# Patient Record
Sex: FEMALE | Race: BLACK OR AFRICAN AMERICAN | NOT HISPANIC OR LATINO | Employment: UNEMPLOYED
[De-identification: names, ages, dates, MRNs, and addresses within clinical notes are randomized per-mention and may not be internally consistent; named-entity substitution may affect disease eponyms.]

---

## 2017-06-17 ENCOUNTER — HEALTH MAINTENANCE LETTER (OUTPATIENT)
Age: 27
End: 2017-06-17

## 2017-10-15 ENCOUNTER — HOSPITAL ENCOUNTER (EMERGENCY)
Facility: CLINIC | Age: 27
Discharge: HOME | End: 2017-10-15
Attending: FAMILY MEDICINE | Admitting: FAMILY MEDICINE
Payer: COMMERCIAL

## 2017-10-15 ENCOUNTER — APPOINTMENT (OUTPATIENT)
Dept: ULTRASOUND IMAGING | Facility: CLINIC | Age: 27
End: 2017-10-15
Attending: FAMILY MEDICINE
Payer: COMMERCIAL

## 2017-10-15 VITALS
TEMPERATURE: 98.2 F | DIASTOLIC BLOOD PRESSURE: 60 MMHG | WEIGHT: 135.38 LBS | HEART RATE: 78 BPM | OXYGEN SATURATION: 100 % | BODY MASS INDEX: 22.53 KG/M2 | SYSTOLIC BLOOD PRESSURE: 97 MMHG | RESPIRATION RATE: 18 BRPM

## 2017-10-15 DIAGNOSIS — R51.9 HEADACHE IN PREGNANCY, ANTEPARTUM, SECOND TRIMESTER: ICD-10-CM

## 2017-10-15 DIAGNOSIS — N94.9 ROUND LIGAMENT PAIN: ICD-10-CM

## 2017-10-15 DIAGNOSIS — Z3A.18 18 WEEKS GESTATION OF PREGNANCY: ICD-10-CM

## 2017-10-15 DIAGNOSIS — O26.892 HEADACHE IN PREGNANCY, ANTEPARTUM, SECOND TRIMESTER: ICD-10-CM

## 2017-10-15 DIAGNOSIS — R10.2 ADNEXAL TENDERNESS, RIGHT: ICD-10-CM

## 2017-10-15 LAB
ALBUMIN UR-MCNC: 10 MG/DL
APPEARANCE UR: CLEAR
BACTERIA #/AREA URNS HPF: ABNORMAL /HPF
BILIRUB UR QL STRIP: NEGATIVE
COLOR UR AUTO: YELLOW
GLUCOSE UR STRIP-MCNC: NEGATIVE MG/DL
HCG UR QL: POSITIVE
HGB UR QL STRIP: NEGATIVE
INTERNAL QC OK POCT: YES
KETONES UR STRIP-MCNC: NEGATIVE MG/DL
LEUKOCYTE ESTERASE UR QL STRIP: NEGATIVE
MUCOUS THREADS #/AREA URNS LPF: PRESENT /LPF
NITRATE UR QL: NEGATIVE
PH UR STRIP: 7 PH (ref 5–7)
RBC #/AREA URNS AUTO: 1 /HPF (ref 0–2)
SOURCE: ABNORMAL
SP GR UR STRIP: 1.02 (ref 1–1.03)
SPECIMEN SOURCE: NORMAL
SQUAMOUS #/AREA URNS AUTO: 3 /HPF (ref 0–1)
UROBILINOGEN UR STRIP-MCNC: 4 MG/DL (ref 0–2)
WBC #/AREA URNS AUTO: <1 /HPF (ref 0–2)
WET PREP SPEC: NORMAL

## 2017-10-15 PROCEDURE — 81001 URINALYSIS AUTO W/SCOPE: CPT | Performed by: FAMILY MEDICINE

## 2017-10-15 PROCEDURE — 99284 EMERGENCY DEPT VISIT MOD MDM: CPT | Performed by: FAMILY MEDICINE

## 2017-10-15 PROCEDURE — 87491 CHLMYD TRACH DNA AMP PROBE: CPT | Performed by: FAMILY MEDICINE

## 2017-10-15 PROCEDURE — 76815 OB US LIMITED FETUS(S): CPT

## 2017-10-15 PROCEDURE — 87591 N.GONORRHOEAE DNA AMP PROB: CPT | Performed by: FAMILY MEDICINE

## 2017-10-15 PROCEDURE — 81025 URINE PREGNANCY TEST: CPT | Performed by: FAMILY MEDICINE

## 2017-10-15 PROCEDURE — 87210 SMEAR WET MOUNT SALINE/INK: CPT | Performed by: FAMILY MEDICINE

## 2017-10-15 PROCEDURE — 99283 EMERGENCY DEPT VISIT LOW MDM: CPT | Mod: Z6 | Performed by: FAMILY MEDICINE

## 2017-10-15 ASSESSMENT — ENCOUNTER SYMPTOMS
CONFUSION: 0
HEADACHES: 0
EYE REDNESS: 0
DIFFICULTY URINATING: 0
SHORTNESS OF BREATH: 0
ABDOMINAL PAIN: 1
FEVER: 0
COLOR CHANGE: 0
ARTHRALGIAS: 0
NECK STIFFNESS: 0

## 2017-10-15 NOTE — ED PROVIDER NOTES
History     Chief Complaint   Patient presents with     Vaginal Bleeding     Pt c/o vaginal spotting. Pt pregnant and c/o abd. cramping.      Vaginitis     Pt c/o vaginal infection.     HPI  Lavern Rodriguez is a 27 year old female with a history of asthma, alcohol abuse, and drug abuse who presents for evaluation of vaginal bleeding. Patient believes she is pregnant, but states she does not remember how long ago her last menstrual period was and has not yet had an ultrasound. Currently, she complains of mild lower abdominal pain as well as vaginal bleeding that has been ongoing for the past two days. She is .     I have reviewed the Medications, Allergies, Past Medical and Surgical History, and Social History in the [employer] system.  Past Medical History:   Diagnosis Date     Alcohol abuse      Asthma      Drug abuse        Past Surgical History:   Procedure Laterality Date     NO         Family History   Problem Relation Age of Onset     Asthma Brother      C.A.D. No family hx of      DIABETES No family hx of      Hypertension No family hx of      CEREBROVASCULAR DISEASE No family hx of      Breast Cancer No family hx of      Cancer - colorectal No family hx of      Prostate Cancer No family hx of        Social History   Substance Use Topics     Smoking status: Current Every Day Smoker     Packs/day: 0.50     Years: 4.00     Types: Cigarettes     Smokeless tobacco: Never Used     Alcohol use No      Comment: Currently in treatment       No current facility-administered medications for this encounter.      Current Outpatient Prescriptions   Medication     sertraline (ZOLOFT) 100 MG tablet     ibuprofen (ADVIL,MOTRIN) 800 MG tablet     ferrous sulfate 325 (65 FE) MG tablet     fexofenadine (ALLEGRA) 180 MG tablet     nicotine polacrilex (NICORETTE) 4 MG lozenge     loratadine (CLARITIN) 10 MG tablet     amitriptyline (ELAVIL) 25 MG tablet      No Known Allergies    Review of Systems   Constitutional:  Negative for fever.   HENT: Negative for congestion.    Eyes: Negative for redness.   Respiratory: Negative for shortness of breath.    Cardiovascular: Negative for chest pain.   Gastrointestinal: Positive for abdominal pain.   Genitourinary: Positive for vaginal bleeding. Negative for difficulty urinating.   Musculoskeletal: Negative for arthralgias and neck stiffness.   Skin: Negative for color change.   Neurological: Negative for headaches.   Psychiatric/Behavioral: Negative for confusion.   All other systems reviewed and are negative.      Physical Exam   BP: 102/58  Pulse: 78  Heart Rate: 80  Temp: 97.2  F (36.2  C)  Resp: 16  Weight: 61.4 kg (135 lb 6 oz)  SpO2: 100 %       Physical Exam    ED Course     ED Course     Procedures   4:08 PM  The patient was seen and examined by Dr. Handy in Room 5.          Critical Care time:  none             Labs Ordered and Resulted from Time of ED Arrival Up to the Time of Departure from the ED   HCG QUAL URINE POCT   WET PREP            Assessments & Plan (with Medical Decision Making)       I have reviewed the nursing notes.    I have reviewed the findings, diagnosis, plan and need for follow up with the patient.    Patient with 18 week pregnancy some abdominal discomfort thought to be possibly round ligament pain patient will follow up with her outpatient providers.        Final diagnoses:   18 weeks gestation of pregnancy   Round ligament pain   ILinda, am serving as a trained medical scribe to document services personally performed by Aleksander Handy MD, based on the provider's statements to me.   Aleksander MADISON MD, was physically present and have reviewed and verified the accuracy of this note documented by Linda Bain.      10/15/2017   [facility], EMERGENCY DEPARTMENT     Aleksander Handy MD  10/18/17 0046

## 2017-10-15 NOTE — DISCHARGE INSTRUCTIONS
Adapting to Pregnancy: Second Trimester  Keep up the healthy habits you started in your first trimester. You might be a little more tired than normal. So plan your day wisely. Look at the tips below and choose the ones that suit your lifestyle.    If you have any questions, check with your healthcare provider.   If you work  If you can, adjust your work with your employer to fit your needs. Try these tips:    If you stand for long periods, find ways to do some tasks while sitting. Also, try to stand with 1 foot resting on a low stool or ledge. Shift your weight from foot to foot often. Wear low-heeled shoes.    If you sit, keep your knees level with your hips. Rest your feet on a firm surface. Sit tall with support for your low back.    If you work long hours, ask about adjusting your schedule. Try taking shorter breaks more often.  When you travel  The second trimester may be the best time for any travel. Talk to your healthcare provider about any special plans you may need to make. Always:    Wear a seat belt. Fasten the lap part under your belly. Wear the shoulder part also.    Take breaks often during long trips by car or plane. Move around to stretch your legs.    Drink plenty of fluids on flights. The air in plane cabins is very dry.    Avoid hot climates or high altitudes if you are not used to them.    Avoid places where the food and water might make you sick.    Make sure you are up-to-date on all immunizations, including the flu vaccine. This is especially important when traveling overseas.  Taking time to relax  Find time to rest and relax at work or at home:    Take short time-outs daily. Do relaxation exercises.    Breathe deeply during stressful times.    Try not to take on too much. Plan tasks for times when you have the most energy.    Take naps when you can. Or just sit and relax.    After week 16, avoid lying on your back for more than a few minutes. Instead, lie on your side. Switch sides  often.  Continuing as lovers  Unless your healthcare provider tells you otherwise, there is no reason to stop having sex now. Blood supply increases to the pelvic area in the second trimester. Because of this, sex might be more enjoyable. Try different positions and see what s best. Also, talk to your partner about any changes in desire. Spotting may happen after sex. Be sure to let your healthcare provider know if there is heavy bleeding.  Keeping your environment safe  You can still clean house and use scented products. Just take some simple precautions:    Wear gloves when using cleaning fluids.    Open windows to let in fresh air. Use a fan if you paint.    Avoid secondhand smoke.    Don t breathe fumes from nail polish, hair spray, cleansers, or other chemicals.  Date Last Reviewed: 8/16/2015 2000-2017 [employer]. [address] [place] [address], [place] [address]. All rights reserved. This information is not intended as a substitute for professional medical care. Always follow your healthcare professional's instructions.

## 2017-10-15 NOTE — ED AVS SNAPSHOT
[facility], Emergency Department    [address]    [place] [address]    Phone:  [phone]    Fax:  [phone]                                       Lavern Rodriguez   MRN: [id number]    Department:  [facility], Emergency Department   Date of Visit:  10/15/2017           Patient Information     Date Of Birth          1990        Your diagnoses for this visit were:     18 weeks gestation of pregnancy     Round ligament pain        You were seen by Aleksander Handy MD.      Follow-up Information     Follow up with [facility]    Why:  As needed    Contact information:    [address] [place] [place] [address]  [phone]          Discharge Instructions         Adapting to Pregnancy: Second Trimester  Keep up the healthy habits you started in your first trimester. You might be a little more tired than normal. So plan your day wisely. Look at the tips below and choose the ones that suit your lifestyle.    If you have any questions, check with your healthcare provider.   If you work  If you can, adjust your work with your employer to fit your needs. Try these tips:    If you stand for long periods, find ways to do some tasks while sitting. Also, try to stand with 1 foot resting on a low stool or ledge. Shift your weight from foot to foot often. Wear low-heeled shoes.    If you sit, keep your knees level with your hips. Rest your feet on a firm surface. Sit tall with support for your low back.    If you work long hours, ask about adjusting your schedule. Try taking shorter breaks more often.  When you travel  The second trimester may be the best time for any travel. Talk to your healthcare provider about any special plans you may need to make. Always:    Wear a seat belt. Fasten the lap part under your belly. Wear the shoulder part also.    Take breaks often during long trips by car or plane. Move around to stretch your legs.    Drink plenty of fluids on flights. The air in  plane cabins is very dry.    Avoid hot climates or high altitudes if you are not used to them.    Avoid places where the food and water might make you sick.    Make sure you are up-to-date on all immunizations, including the flu vaccine. This is especially important when traveling overseas.  Taking time to relax  Find time to rest and relax at work or at home:    Take short time-outs daily. Do relaxation exercises.    Breathe deeply during stressful times.    Try not to take on too much. Plan tasks for times when you have the most energy.    Take naps when you can. Or just sit and relax.    After week 16, avoid lying on your back for more than a few minutes. Instead, lie on your side. Switch sides often.  Continuing as lovers  Unless your healthcare provider tells you otherwise, there is no reason to stop having sex now. Blood supply increases to the pelvic area in the second trimester. Because of this, sex might be more enjoyable. Try different positions and see what s best. Also, talk to your partner about any changes in desire. Spotting may happen after sex. Be sure to let your healthcare provider know if there is heavy bleeding.  Keeping your environment safe  You can still clean house and use scented products. Just take some simple precautions:    Wear gloves when using cleaning fluids.    Open windows to let in fresh air. Use a fan if you paint.    Avoid secondhand smoke.    Don t breathe fumes from nail polish, hair spray, cleansers, or other chemicals.  Date Last Reviewed: 8/16/2015 2000-2017 [employer]. [address] [place] [address], [place] [address]. All rights reserved. This information is not intended as a substitute for professional medical care. Always follow your healthcare professional's instructions.          24 Hour Appointment Hotline       To make an appointment at any [facility] clinic, call [phone] ([phone]). If you don't have a family doctor or clinic, we will help you  find one. [facility] are conveniently located to serve the needs of you and your family.             Review of your medicines      Our records show that you are taking the medicines listed below. If these are incorrect, please call your family doctor or clinic.        Dose / Directions Last dose taken    amitriptyline 25 MG tablet   Commonly known as:  ELAVIL   Dose:  25 mg   Quantity:  90 tablet        Take 1 tablet by mouth At Bedtime. Take 1/2 tab at night for 3 nights, then increase to 1 tab nightly   Refills:  1        ferrous sulfate 325 (65 FE) MG tablet   Commonly known as:  IRON   Dose:  1 tablet   Quantity:  30 tablet        Take 1 tablet by mouth daily (with breakfast).   Refills:  2        fexofenadine 180 MG tablet   Commonly known as:  ALLEGRA   Dose:  180 mg   Quantity:  30 tablet        Take 1 tablet by mouth daily.   Refills:  1        ibuprofen 800 MG tablet   Commonly known as:  ADVIL/MOTRIN   Dose:  800 mg   Quantity:  30 tablet        Take 1 tablet by mouth every 8 hours as needed for pain.   Refills:  1        loratadine 10 MG tablet   Commonly known as:  CLARITIN   Dose:  10 mg   Quantity:  30 tablet        Take 1 tablet by mouth daily.   Refills:  1        nicotine polacrilex 4 MG lozenge   Commonly known as:  NICORETTE   Dose:  4 mg   Quantity:  360 tablet        Place 1 lozenge inside cheek as needed for smoking cessation.   Refills:  0        sertraline 100 MG tablet   Commonly known as:  ZOLOFT   Dose:  150 mg   Quantity:  90 tablet        Take 1.5 tablets (150 mg) by mouth daily   Refills:  1                Procedures and tests performed during your visit     Chlamydia trachomatis PCR    Neisseria gonorrhoeae PCR    UA with Microscopic reflex to Culture    US OB Limited One Or More Fetuses    Wet prep    hCG qual urine POCT      Orders Needing Specimen Collection     None      Pending Results     Date and Time Order Name Status Description    10/15/2017 9832 US OB Limited One Or  More Fetuses In process     10/15/2017 1611 Chlamydia trachomatis PCR In process     10/15/2017 1611 Neisseria gonorrhoeae PCR In process             Pending Culture Results     Date and Time Order Name Status Description    10/15/2017 1611 Chlamydia trachomatis PCR In process     10/15/2017 1611 Neisseria gonorrhoeae PCR In process             Pending Results Instructions     If you had any lab results that were not finalized at the time of your Discharge, you can call the ED Lab Result RN at [phone]. You will be contacted by this team for any positive Lab results or changes in treatment. The nurses are available 7 days a week from 10A to 6:30P.  You can leave a message 24 hours per day and they will return your call.        Thank you for choosing [facility]       Thank you for choosing [facility] for your care. Our goal is always to provide you with excellent care. Hearing back from our patients is one way we can continue to improve our services. Please take a few minutes to complete the written survey that you may receive in the mail after you visit with us. Thank you!        [employer] Information     [employer] gives you secure access to your electronic health record. If you see a primary care provider, you can also send messages to your care team and make appointments. If you have questions, please call your primary care clinic.  If you do not have a primary care provider, please call [phone] and they will assist you.        Care EveryWhere ID     This is your Care EveryWhere ID. This could be used by other organizations to access your [facility] medical records  [id number]        Equal Access to Services     VIRA SINGH : Hadii lynn Duque, waaxda luqadaha, qaybta kaalmada mary, clementina schuler. So [facility] [phone].    ATENCIÓN: Si habla español, tiene a grimaldo disposición servicios gratuitos de asistencia lingüística. Llame al [phone].    We comply with  applicable federal civil rights laws and [place] laws. We do not discriminate on the basis of race, color, national origin, age, disability, sex, sexual orientation, or gender identity.            After Visit Summary       This is your record. Keep this with you and show to your community pharmacist(s) and doctor(s) at your next visit.

## 2017-10-15 NOTE — ED AVS SNAPSHOT
[facility] [place], Emergency Department    [address]    [facility] [address]    Phone:  [phone]    Fax:  [phone]                                       Lavern Rodriguez   MRN: [id number]    Department:  [facility], Emergency Department   Date of Visit:  10/15/2017           After Visit Summary Signature Page     I have received my discharge instructions, and my questions have been answered. I have discussed any challenges I see with this plan with the nurse or doctor.    ..........................................................................................................................................  Patient/Patient Representative Signature      ..........................................................................................................................................  Patient Representative Print Name and Relationship to Patient    ..................................................               ................................................  Date                                            Time    ..........................................................................................................................................  Reviewed by Signature/Title    ...................................................              ..............................................  Date                                                            Time

## 2017-10-16 LAB
C TRACH DNA SPEC QL NAA+PROBE: NEGATIVE
N GONORRHOEA DNA SPEC QL NAA+PROBE: NEGATIVE
SPECIMEN SOURCE: NORMAL
SPECIMEN SOURCE: NORMAL

## 2018-06-23 ENCOUNTER — HEALTH MAINTENANCE LETTER (OUTPATIENT)
Age: 28
End: 2018-06-23

## 2019-10-01 ENCOUNTER — HEALTH MAINTENANCE LETTER (OUTPATIENT)
Age: 29
End: 2019-10-01

## 2020-08-05 LAB
ABO + RH BLD: NORMAL
ABO + RH BLD: NORMAL
BLD GP AB SCN SERPL QL: NORMAL
C TRACH DNA SPEC QL PROBE+SIG AMP: NORMAL
HBV SURFACE AG SERPL QL IA: NORMAL
HEMOGLOBIN: 12.2 G/DL (ref 11.7–15.7)
HIV 1+2 AB+HIV1 P24 AG SERPL QL IA: NORMAL
N GONORRHOEA DNA SPEC QL PROBE+SIG AMP: NORMAL
PAP: NORMAL
PLATELET # BLD AUTO: 354 10^9/L
RUBELLA ANTIBODY IGG QUANTITATIVE: NORMAL IU/ML
TREPONEMA ANTIBODIES: NORMAL

## 2020-09-24 ENCOUNTER — PRENATAL OFFICE VISIT (OUTPATIENT)
Dept: OBGYN | Facility: CLINIC | Age: 30
End: 2020-09-24
Payer: COMMERCIAL

## 2020-09-24 VITALS — WEIGHT: 149 LBS | BODY MASS INDEX: 24.83 KG/M2 | HEIGHT: 65 IN

## 2020-09-24 DIAGNOSIS — Z34.90 SUPERVISION OF NORMAL PREGNANCY: Primary | ICD-10-CM

## 2020-09-24 PROCEDURE — 99207 ZZC NO CHARGE NURSE ONLY: CPT

## 2020-09-24 RX ORDER — QUETIAPINE FUMARATE 25 MG/1
12.5 TABLET, FILM COATED ORAL 2 TIMES DAILY
COMMUNITY
End: 2021-02-12

## 2020-09-24 RX ORDER — FERROUS SULFATE 325(65) MG
325 TABLET ORAL
COMMUNITY
End: 2021-02-12

## 2020-09-24 RX ORDER — ONDANSETRON 4 MG/1
TABLET, FILM COATED ORAL EVERY 8 HOURS PRN
COMMUNITY
End: 2021-02-12

## 2020-09-24 RX ORDER — PRENATAL VIT/IRON FUM/FOLIC AC 27MG-0.8MG
1 TABLET ORAL DAILY
COMMUNITY
End: 2021-11-29

## 2020-09-24 RX ORDER — SERTRALINE HYDROCHLORIDE 25 MG/1
50 TABLET, FILM COATED ORAL DAILY
COMMUNITY
End: 2021-11-29

## 2020-09-24 ASSESSMENT — MIFFLIN-ST. JEOR: SCORE: 1401.74

## 2020-09-24 NOTE — PROGRESS NOTES
Phone call with pt for New Prenatal Intake and Education. This is patient's 4th pregnancy. She has 3 living children, ages 10, 2 and 6 months.  They are all currently with CPS.  Pt is currently in inpatient treatment for alcohol and drug (cocaine) use.  She is planning on being discharged from inpatient to outpatient this week.  She has been 50 days sober today.   Scheduled for New Prenatal with Dr. Cabral on Monday, 9/28.       Prenatal OB Questionnaire  Patient supplied answers from flow sheet for:  Prenatal OB Questionnaire.  Past Medical History  Diabetes?: No  Hypertension : No  Heart disease, mitral valve prolapse or rheumatic fever?: No  An autoimmune disease such as lupus or rheumatoid arthritis?: No  Kidney disease or urinary tract infection?: No  Epilepsy, seizures or spells?: No  Migraine headaches?: No  A stroke or loss of function or sensation?: No  Any other neurological problems?: No  Have you ever been treated for depression?: (!) Yes  Are you having problems with crying spells or loss of self-esteem?: No  Have you ever required psychiatric care?: No  Have you ever had hepatitis, liver disease or jaundice?: No  Have you been treated for blood clots in your veins, deep vein thromosis, inflammation in the veins, thrombosis, phlebitis, pulmonary embolism or varicosities?: No  Have you had excessive bleeding after surgery or dental work?: No  Do you bleed more than other women after a cut or scratch?: No  Do you have a history of anemia?: (!) Yes(currently on iron)  Have you ever had thyroid problems or taken thyroid medication?: No   Do you have any endocrine problems?: No  Have you ever been in a major accident or suffered serious trauma?: No  Within the last year, has anyone hit, slapped, kicked or otherwise hurt you?: No  In the last year, has anyone forced you to have sex when you didn't want to?: No    Past Medical History 2   Have you ever received a blood transfusion?: No  Would you refuse a blood  transfusion if a doctor judged it to be medically necessary?: No  Does anyone in your home smoke?: (!) Yes  Do you use tobacco products?: (!) Yes  Do you drink beer, wine or hard liquor?: No  Do you use any of the following: marijuana, speed, cocaine, heroin, hallucinogens or other drugs?: No   Is your blood type Rh negative?: Unknown  Have you ever had abnormal antibodies in your blood?: No  Have you ever had asthma?: (!) Yes(when she was little)  Have you ever had tuberculosis?: No  Do you have any allergies to drugs or over-the-counter medications?: No  Allergies: Dust Mites, Aspartame, Ethanol, Venlafaxine, Hydrochloride, Sertraline: No  Have you had any breast problems?: No  Have you ever ?: No  Have you had any gynecological surgical procedures such as cervical conization, a LEEP procedure, laser treatment, cryosurgery of the cervix or a dilation and curettage, etc?: No  Have you ever had any other surgical procedures?: No  Have you been hospitalized for a nonsurgical reason excluding normal delivery?: No  Have you ever had any anesthetic complications?: No  Have you ever had an abnormal pap smear?: (!) Yes    Past Medical History (Continued)  Do you have a history of abnormalities of the uterus?: No  Did your mother take PABLITO or any other hormones when she was pregnant with you?: No  Did it take you more than a year to become pregnant?: No  Have you ever been evaluated or treated for infertility?: No  Is there a history of medical problems in your family, which you feel may be important to this pregnancy?: No  Do you have any other problems we have not asked about which you feel may be important to this pregnancy?: No    Symptoms since last menstrual period  Do you have any of the following symptoms: abdominal pain, blood in stools or urine, chest pain, shortness of breath, coughing or vomiting up blood, your heart racing or skipping beats, nausea and vomiting, pain on urination or vaginal discharge  or bleed: No  Current medications, including over-the-counter medications, you are using? (If not applicable answer none): Zofran, prenatal, iron, Zoloft  Will the patient be 35 years old or older at the time of delivery?: No    Has the patient, baby's father or anyone in either family had:  Thalassemia (Italian, Greek, Mediterranean or  background only) and an MCV result less than 80?: No  Neural tube defect such as meningomyelocele, spina bifida or anencephaly?: No  Congenital heart defect?: No  Down's Syndrome?: No  Johnny-Sachs disease (Baptism, Cajun, English-Starr)?: No  Sickle cell disease or trait ()?: No  Hemophilia or other inherited problems of blood?: No  Muscular dystrophy?: No  Cystic fibrosis?: No  Wingate's chorea?: No  Mental retardation/autism?: No  Any other inherited genetic or chromosomal disorder?: No  Maternal metabolic disorder (e.g Insulin-dependent diabetes, PKU)?: No  A child with birth defects not listed above?: No  Recurrent pregnancy loss or stillbirth?: No   Has the patient had any medications/street drugs/alcohol since her last menstrual period?: (!) Yes(alcohol and drugs (cocaine)-currently in treatment)  Does the patient or baby's father have any other genetic risks?: No    Infection History   Do you object to being tested for Hepatitis B?: No  Do you object to being tested for HIV?: No   Do you feel that you are at high risk for coming in contact with the AIDS virus?: No  Have you ever been treated for tuberculosis?: No  Have you ever had a positive skin test for tuberculosis?: No  Do you live with someone who has tuberculosis?: No  Have you ever been exposed to tuberculosis?: No  Do you have genital herpes?: No  Does your partner have genital herpes?: No  Have you had a viral illness since your last period?: No  Have you ever had gonorrhea, chlamydia, syphilis, venereal warts, trichomoniasis, pelvic inflammatory disease or any other sexually transmitted disease?:  No  Do you know if you are a genital group B streptococcus carrier?: No  Have you had chicken pox/varicella?: No   Have you been vaccinated against chicken Pox?: (!) Yes  Have you had any other infectious diseases?: No      Allergies as of 9/24/2020:    Allergies as of 09/24/2020     (No Known Allergies)       Current medications are:  Current Outpatient Medications   Medication Sig Dispense Refill     ferrous sulfate (FEROSUL) 325 (65 Fe) MG tablet Take 325 mg by mouth daily (with breakfast)       ondansetron (ZOFRAN) 4 MG tablet Take by mouth every 8 hours as needed for nausea       Prenatal Vit-Fe Fumarate-FA (PRENATAL MULTIVITAMIN W/IRON) 27-0.8 MG tablet Take 1 tablet by mouth daily       QUEtiapine (SEROQUEL) 25 MG tablet Take 12.5 mg by mouth 2 times daily       sertraline (ZOLOFT) 25 MG tablet Take 25 mg by mouth daily       amitriptyline (ELAVIL) 25 MG tablet Take 1 tablet by mouth At Bedtime. Take 1/2 tab at night for 3 nights, then increase to 1 tab nightly 90 tablet 1     ferrous sulfate 325 (65 FE) MG tablet Take 1 tablet by mouth daily (with breakfast). 30 tablet 2     fexofenadine (ALLEGRA) 180 MG tablet Take 1 tablet by mouth daily. 30 tablet 1     ibuprofen (ADVIL,MOTRIN) 800 MG tablet Take 1 tablet by mouth every 8 hours as needed for pain. 30 tablet 1     loratadine (CLARITIN) 10 MG tablet Take 1 tablet by mouth daily. 30 tablet 1     nicotine polacrilex (NICORETTE) 4 MG lozenge Place 1 lozenge inside cheek as needed for smoking cessation. 360 tablet 0     sertraline (ZOLOFT) 100 MG tablet Take 1.5 tablets (150 mg) by mouth daily 90 tablet 1         Early ultrasound screening tool:    Does patient have irregular periods?  No  Did patient use hormonal birth control in the three months prior to positive urine pregnancy test? No  Is the patient breastfeeding?  No  Is the patient 10 weeks or greater at time of education visit?  Yes but pt has had prenatal care through [facility] and states she had an US  with them.    Becky Delarosa RN

## 2020-09-28 ENCOUNTER — PRENATAL OFFICE VISIT (OUTPATIENT)
Dept: OBGYN | Facility: CLINIC | Age: 30
End: 2020-09-28
Payer: COMMERCIAL

## 2020-09-28 VITALS
WEIGHT: 153.5 LBS | OXYGEN SATURATION: 100 % | DIASTOLIC BLOOD PRESSURE: 68 MMHG | BODY MASS INDEX: 25.54 KG/M2 | SYSTOLIC BLOOD PRESSURE: 102 MMHG | HEART RATE: 70 BPM

## 2020-09-28 DIAGNOSIS — Z34.80 SUPERVISION OF OTHER NORMAL PREGNANCY, ANTEPARTUM: Primary | ICD-10-CM

## 2020-09-28 PROCEDURE — 99207 ZZC NO CHARGE NURSE ONLY: CPT | Performed by: OBSTETRICS & GYNECOLOGY

## 2020-09-28 ASSESSMENT — PAIN SCALES - GENERAL: PAINLEVEL: NO PAIN (0)

## 2020-09-28 NOTE — PATIENT INSTRUCTIONS
If you have any questions regarding your visit, Please contact your care team.     [facility] [employer]: [phone]  [facility] CLINIC HOURS TELEPHONE NUMBER       Terence Cabral M.D.      Britt Simpson-Medical Assistant    Kimi-  Juhi-     Monday-[place]  8:00a.m-4:45 p.m  Tuesday-[place]  9:00a.m-4:00 p.m  Wednesday-[place] 8:00a.m-4:45 p.m.  Thursday-[place]  8:00a.m-4:45 p.m.  Friday-[place]  8:00a.m-4:45 p.m. [facility]  [address]  [place], [place] [address] [phone] ask [facility]  [phone] Fax  Imaging [phone]    [facility]  [address]  [place], [place] [id number] [phone]    [facility]  [address] [place] [address] [phone] [facility]  [phone] Fax  Imaging [phone]     Urgent Care locations:    [place] [facility] Monday-Friday  5 pm - 9 pm  Saturday and Sunday   9 am - 5 pm  Monday-Friday   11 am - 9 pm  Saturday and Sunday   9 am - 5 pm   [phone]   If you need a medication refill, please contact your pharmacy. Please allow 3 business days for your refill to be completed.  As always, Thank you for trusting us with your healthcare needs!

## 2020-09-28 NOTE — PROGRESS NOTES
Patient was checked in for 1:30 pm appointment but left soon thereafter without being seen due to needing to  her children.   Terence Cabral MD

## 2020-10-07 ENCOUNTER — MYC MEDICAL ADVICE (OUTPATIENT)
Dept: OBGYN | Facility: OTHER | Age: 30
End: 2020-10-07

## 2020-10-08 ENCOUNTER — PRENATAL OFFICE VISIT (OUTPATIENT)
Dept: OBGYN | Facility: OTHER | Age: 30
End: 2020-10-08
Payer: COMMERCIAL

## 2020-10-08 VITALS
BODY MASS INDEX: 24.39 KG/M2 | SYSTOLIC BLOOD PRESSURE: 100 MMHG | HEART RATE: 68 BPM | TEMPERATURE: 98.2 F | HEIGHT: 66 IN | DIASTOLIC BLOOD PRESSURE: 52 MMHG | WEIGHT: 151.75 LBS

## 2020-10-08 DIAGNOSIS — Z67.91 RH NEGATIVE STATE IN ANTEPARTUM PERIOD, SECOND TRIMESTER: ICD-10-CM

## 2020-10-08 DIAGNOSIS — O26.819 PREGNANCY RELATED FATIGUE, ANTEPARTUM: ICD-10-CM

## 2020-10-08 DIAGNOSIS — O99.321: ICD-10-CM

## 2020-10-08 DIAGNOSIS — Z34.02 ENCOUNTER FOR SUPERVISION OF NORMAL FIRST PREGNANCY IN SECOND TRIMESTER: Primary | ICD-10-CM

## 2020-10-08 DIAGNOSIS — Z23 NEED FOR TDAP VACCINATION: ICD-10-CM

## 2020-10-08 DIAGNOSIS — F14.10: ICD-10-CM

## 2020-10-08 DIAGNOSIS — O26.892 RH NEGATIVE STATE IN ANTEPARTUM PERIOD, SECOND TRIMESTER: ICD-10-CM

## 2020-10-08 LAB
T4 FREE SERPL-MCNC: 0.96 NG/DL (ref 0.76–1.46)
TSH SERPL DL<=0.005 MIU/L-ACNC: 0.38 MU/L (ref 0.4–4)

## 2020-10-08 PROCEDURE — 99207 PR FIRST OB VISIT: CPT | Performed by: ADVANCED PRACTICE MIDWIFE

## 2020-10-08 PROCEDURE — 84439 ASSAY OF FREE THYROXINE: CPT | Performed by: ADVANCED PRACTICE MIDWIFE

## 2020-10-08 PROCEDURE — 84443 ASSAY THYROID STIM HORMONE: CPT | Performed by: ADVANCED PRACTICE MIDWIFE

## 2020-10-08 ASSESSMENT — MIFFLIN-ST. JEOR: SCORE: 1422.14

## 2020-10-08 ASSESSMENT — PATIENT HEALTH QUESTIONNAIRE - PHQ9
10. IF YOU CHECKED OFF ANY PROBLEMS, HOW DIFFICULT HAVE THESE PROBLEMS MADE IT FOR YOU TO DO YOUR WORK, TAKE CARE OF THINGS AT HOME, OR GET ALONG WITH OTHER PEOPLE: SOMEWHAT DIFFICULT
SUM OF ALL RESPONSES TO PHQ QUESTIONS 1-9: 4
SUM OF ALL RESPONSES TO PHQ QUESTIONS 1-9: 4

## 2020-10-08 NOTE — PROGRESS NOTES
Lavern Rodriguez is a 30 year old  who presents to the clinic for a transfer ob visit from the [facility].  Here with her partner Patience and two of her three children. .   Estimated Date of Delivery: Mar 15, 2021 is calculated from Patient's last menstrual period was 2020.       She has not had bleeding since her LMP.   She has had mild nausea. Weigh loss has not occurred.   Was in treatment for cocaine until September   Complains of fatigue despite feeling that she sleeps well  Is tired even when not pregnant.   Also complains of itching over most of her body.  Recommended that we start with anti itch lotion.     HISTORY  updated and reviewed  Past Medical History:   Diagnosis Date     Alcohol abuse      Asthma      Drug abuse (H)      Past Surgical History:   Procedure Laterality Date     HC INSERTION INTRAUTERINE DEVICE       HC REMOVE INTRAUTERINE DEVICE       Social History     Socioeconomic History     Marital status: Single     Spouse name: Not on file     Number of children: 3     Years of education: Not on file     Highest education level: Not on file   Occupational History     Not on file   Social Needs     Financial resource strain: Not on file     Food insecurity     Worry: Not on file     Inability: Not on file     Transportation needs     Medical: Not on file     Non-medical: Not on file   Tobacco Use     Smoking status: Current Every Day Smoker     Packs/day: 0.50     Years: 4.00     Pack years: 2.00     Types: Cigarettes     Smokeless tobacco: Never Used   Substance and Sexual Activity     Alcohol use: Not Currently     Comment: Currently in treatment     Drug use: Not Currently     Comment: Currently in treatment     Sexual activity: Yes     Partners: Male     Birth control/protection: None   Lifestyle     Physical activity     Days per week: Not on file     Minutes per session: Not on file     Stress: Not on file   Relationships     Social connections     Talks on phone: Not on  "file     Gets together: Not on file     Attends Yazidism service: Not on file     Active member of club or organization: Not on file     Attends meetings of clubs or organizations: Not on file     Relationship status: Not on file     Intimate partner violence     Fear of current or ex partner: Not on file     Emotionally abused: Not on file     Physically abused: Not on file     Forced sexual activity: Not on file   Other Topics Concern     Parent/sibling w/ CABG, MI or angioplasty before 65F 55M? Not Asked   Social History Narrative     Not on file     Health Maintenance   Topic Date Due     Pneumococcal Vaccine: Pediatrics (0 to 5 Years) and At-Risk Patients (6 to 64 Years) (1 of 1 - PPSV23) 09/07/1997     PREVENTIVE CARE VISIT  09/25/2013     PHQ-9  11/24/2013     INFLUENZA VACCINE (1) 09/01/2020     PAP  08/05/2021     DTAP/TDAP/TD IMMUNIZATION (2 - Td) 09/25/2022     HIV SCREENING  Completed     DEPRESSION ACTION PLAN  Completed     IPV IMMUNIZATION  Completed     MENINGITIS IMMUNIZATION  Aged Out     HEPATITIS B IMMUNIZATION  Aged Out     Family History   Problem Relation Age of Onset     Breast Cancer Mother 46     Diabetes Father      Asthma Brother      Diabetes Maternal Grandmother      Diabetes Maternal Grandfather      C.A.D. No family hx of      Hypertension No family hx of      Cerebrovascular Disease No family hx of      Cancer - colorectal No family hx of      Prostate Cancer No family hx of             ROS: 10 point ROS neg other than the symptoms noted above in the HPI.      PHYSICAL EXAM  /52   Pulse 68   Temp 98.2  F (36.8  C) (Oral)   Ht 1.664 m (5' 5.5\")   Wt 68.8 kg (151 lb 12 oz)   LMP 06/08/2020   BMI 24.87 kg/m    GENERAL:  Pleasant pregnant female, alert, cooperative  and well groomed.  SKIN:  Warm and dry, without lesions or rashes  HEAD: Symmetrical features.  EYES:  PERRLA.  EARS: Tympanic membranes gray, translucent and intact.  MOUTH:  Buccal mucosa pink, moist without " lesions.     NECK:  Thyroid without enlargement and nodules.  Lymph nodes not palpable.   LUNGS:  Clear to auscultation.     HEART:  RRR with soft AUBRIE.  ABDOMEN: Soft without masses , tenderness or organomegaly.  No CVA tenderness.  Uterus palpable at size equal to dates.  No scars noted..  MUSCULOSKELETAL:  Full range of motion  EXTREMITIES:  No edema. No significant varicosities.    ASSESSMENT:  Intrauterine pregnancy of 17w3d  (O26.819) Pregnancy related fatigue, antepartum  (primary encounter diagnosis)  Comment:   Plan:     (Z34.02) Encounter for supervision of normal first pregnancy in second trimester  Comment:   Plan: TSH with free T4 reflex, US OB > 14 Weeks              PLAN:  .    Instructed on best evidence for: weight gain for her weight and height for pregnancy; healthy diet and foods to avoid; exercise and activity during pregnancy;avoiding exposure to toxoplasmosis; and maintenance of a generally healthy lifestyle.     [facility] [place] [facility]    Reviewed transmission of and avoidance strategies for CMV.      Will plan random UDS.       Answers for HPI/ROS submitted by the patient on 10/8/2020   If you checked off any problems, how difficult have these problems made it for you to do your work, take care of things at home, or get along with other people?: Somewhat difficult  PHQ9 TOTAL SCORE: 4

## 2020-10-08 NOTE — PROGRESS NOTES
Answers for HPI/ROS submitted by the patient on 10/8/2020   If you checked off any problems, how difficult have these problems made it for you to do your work, take care of things at home, or get along with other people?: Somewhat difficult  PHQ9 TOTAL SCORE: 4

## 2020-10-08 NOTE — PATIENT INSTRUCTIONS
Any anti itch lotion may help such as sarna or cerave.          Thank for choosing our clinic for your health care needs. Our goal is to provided you with excellent care. One way that we continue to improve our care is by listening to our patients. Please take a few minutes to complete the written survey that you may receive in the mail after your visit.     You may reach your care team by calling the following number:    [facility] [phone]  [place] [phone]  For clinic hours at [facility]  please visit the [facility] web site [email or web address]    Notification of your lab results:  Normal or non-critical lab and imaging results will be communicated to you by Mychart, letter, or phone within 7 days. If you do not hear from us within 10 days, please contact us through Taifatechhart or phone. If you have a critical or abnormal lab result, we will notify you by phone as soon as possible.  Pap smears often take a bit longer.     After Hours nurse advice:  [facility]:  [phone]     Medication Refills:  Please contact your pharmacy and they will forward the refill to us. Please allow 3 business days for your refills to be completed.     Secure access to your medical record:  Use Taifatechhart (secure email communication and access to your chart) to send your primary care provider a message or make an appointment. Ask someone on your Team how to sign up for [employer]ist. To log on to [email or web address] or for more information in [place] please visit the website at [email or web address].            How to prevent CMV or cytomegalovirus infection while you are pregnant:    Thoroughly wash your hands with soap and warm water especially after doing things such as:  Diaper changes  Feeding or bathing a child  Wiping a child's runny nose or drool  Handling a child's toys    Do not share cups, plates, utensils, toothbrushes or food with your children  Do not kiss young children on the mouth or cheek. Instead, kiss  them on the head or give them a hug.  Do not share towels or washcloths with young children.  Clean toy, counter tops, and other surfaces that come in contact with urine or saliva.        LISTERIA  Individuals can reduce the risk for listeria contamination through proper food selection, handling, and storage, such as:    Rinsing raw produce (fuits and vegetables) before eating, cutting, or cooking;    Keeping refrigerators at 40 degrees F or lower;    Buying soft cheeses only if their labels state that they are made with pasteurized milk.  Also avoiding cheese that has not been initially wrapped in plastic.  These cheeses include brie, camembert, blue and the soft Mexican cheeses like con queso.    Heating all food that can be heated but especially hot dogs, luncheon meats, and cold cuts to an internal temperature of 165 degrees F or until steaming hot before serving them; and    Washing your hands for at least 20 seconds with warm water and soap before and after handling cantaloupes or other melons.    Watch for food recalls for listeria and contact us if you believe you have been exposed.               First line remedies for nausea in pregnancy    Eat small frequent meals every 2-3 hours if possible.   Avoid food at extremes of temparture and drinks with carbination.  Eat foods that appeal to you, avoiding fats and spicy foods.  Bread, pasta, crackers, potatoes, and rice tend to be tolerated the best.  Don't worry about what you eat in the first 3 months, it is more important that you can eat and keep it down.   Try flat ginger ale.  Ginger is a herbal remedy for nausea and you can use it in any form.  There are ginger tablets you can purchase.  The dose is 500 to 1000 mg a day.   You may also try doxylamine 12.5 mg three times a day which is a sleeping medication along with Vitamin B6 25 mg three times a day.  This combination takes up to a week to work so give it some time.  Be sure to take both the doxylamine  and B6  Doxylamine is sometimes called Unisom and it comes in 25 mg tablets so you will have to break it in half and take half a tablet.   It is important to take the Unisom and B6 together or it won't be effective.  If you begin to vomit more than 5 or 6 times a day and feel that you are unable to keep anything down, call the clinic for an appointment to be seen.                Fruits and vegetables high in pesticide contamination  Strawberries  Spinach  Kale  Nectarines  Peaches  Apples  Grapes  Cherries  Pears  Tomatoes  Celery  Potatoes  Hot Peppers.     Consider extra washing of these fruits and vegetables, peeling if possible or purchasing organics.     Fruits and vegetables lowest if pesticide contramination:  Avocado  Sweet corn  Pineapple  Cabbage   Onions   Frozen peas  Papaya  Asparagus  Mushrooms  Eggplant  Honeydew  Kiwi  Cantaloupe  Cauliflower  Broccoli      Consider eliminating or reducing the following additives in personal care products and make up:  Triclosan  Paraben  Phthalates  Phenols  Products that do not contain these additives are often found in the organic or green sections of stores.

## 2020-10-09 ASSESSMENT — PATIENT HEALTH QUESTIONNAIRE - PHQ9: SUM OF ALL RESPONSES TO PHQ QUESTIONS 1-9: 4

## 2020-10-21 ENCOUNTER — NURSE TRIAGE (OUTPATIENT)
Dept: NURSING | Facility: CLINIC | Age: 30
End: 2020-10-21

## 2020-10-21 NOTE — TELEPHONE ENCOUNTER
"Caller is  19 weeks pregnant    Caller is reporting  episodes of abdominal pain that occur when she eats or drinks; pain in epigastric area and  radiates  to back; has sour taste in her mouth; present for 2 days   Triage protocol reviewed   Advised in home care   advised to be seen within 2 weeks for evaluating for this  Advised to call back for any new or worsening symptoms   caller understands and will comply  Marimar Yip RN  FNA        Reason for Disposition    [1] Upper abdominal pains AND [2] radiate into chest, with sour taste in mouth    Additional Information    Negative: Passed out (i.e., lost consciousness, collapsed and was not responding)    Negative: Shock suspected (e.g., cold/pale/clammy skin, too weak to stand, low BP, rapid pulse)    Negative: Difficult to awaken or acting confused (e.g., disoriented, slurred speech)    Negative: Sounds like a life-threatening emergency to the triager    Negative: Followed an abdomen (stomach) injury    Negative: [1] Abdominal pain AND [2] pregnant > 20 weeks    Negative: MODERATE-SEVERE abdominal pain (e.g., interferes with normal activities, awakens from sleep)    Negative: [1] SEVERE vaginal bleeding (i.e., soaking 2 pads / hour, large blood clots) AND [2] present 2 or more hours    Negative: [1] MODERATE vaginal bleeding (i.e., soaking 1 pad / hour; clots) AND [2] present > 6 hours    Negative: [1] MODERATE vaginal bleeding (i.e., soaking 1 pad / hour; clots) AND [2] pregnant > 12 weeks    Negative: Passed tissue (e.g., gray-white)    Negative: [1] Vomiting AND [2] contains red blood or black (\"coffee ground\") material  (Exception: few red streaks in vomit that only happened once)    Negative: Shoulder pain    Negative: Lightheadedness or dizziness (e.g., feels like passing out)    Negative: Patient sounds very sick or weak to the triager    Negative: [1] Constant abdominal pain AND [2] present > 2 hours    Negative: Blood in urine (red, pink, or " "tea-colored)    Negative: Fever > 100.4 F (38.0 C)    Negative: White of the eyes have turned yellow (i.e., jaundice)    Negative: [1] Intermittent lower abdominal pain (e.g., cramping) AND [2] present > 24 hours    Negative: MILD vaginal bleeding (e.g., < 1 pad / hour) or SPOTTING    Negative: Pain or burning with passing urine (urination)    Negative: Prior history of \"ectopic pregnancy\" or previous tubal surgery (e.g., tubal ligation)    Protocols used: PREGNANCY - ABDOMINAL PAIN LESS THAN 20 WEEKS EGA-A-      "

## 2020-10-26 ENCOUNTER — HOSPITAL ENCOUNTER (OUTPATIENT)
Dept: ULTRASOUND IMAGING | Facility: CLINIC | Age: 30
Discharge: HOME | End: 2020-10-26
Attending: ADVANCED PRACTICE MIDWIFE | Admitting: ADVANCED PRACTICE MIDWIFE
Payer: COMMERCIAL

## 2020-10-26 DIAGNOSIS — Z34.02 ENCOUNTER FOR SUPERVISION OF NORMAL FIRST PREGNANCY IN SECOND TRIMESTER: ICD-10-CM

## 2020-10-26 PROCEDURE — 76805 OB US >/= 14 WKS SNGL FETUS: CPT

## 2020-10-28 ENCOUNTER — TELEPHONE (OUTPATIENT)
Dept: OBGYN | Facility: OTHER | Age: 30
End: 2020-10-28

## 2020-10-28 NOTE — TELEPHONE ENCOUNTER
Called and discussed results of ultrasound.  Will follow up with new provider.   Rukhsana Zapata, AVTAR, CNM

## 2020-10-28 NOTE — TELEPHONE ENCOUNTER
Reason for Call:  Request for results:    Name of test or procedure: ultrasound    Date of test of procedure: 10/26    Location of the test or procedure: [place]    OK to leave the result message on voice mail or with a family member? NO    Phone number Patient can be reached at:  Home number on file [phone] (home)    Additional comments: Patient returning call about results    Call taken on 10/28/2020 at 1:32 PM by Vel Rivera

## 2020-10-28 NOTE — TELEPHONE ENCOUNTER
RN routing to Rukhsana, who had called and left message to discuss these results.     Chiara Miranda RN on 10/28/2020 at 2:59 PM

## 2020-11-20 ENCOUNTER — TELEPHONE (OUTPATIENT)
Dept: OBGYN | Facility: CLINIC | Age: 30
End: 2020-11-20

## 2020-11-30 ENCOUNTER — PRENATAL OFFICE VISIT (OUTPATIENT)
Dept: OBGYN | Facility: CLINIC | Age: 30
End: 2020-11-30
Payer: COMMERCIAL

## 2020-11-30 VITALS — DIASTOLIC BLOOD PRESSURE: 62 MMHG | BODY MASS INDEX: 26.15 KG/M2 | SYSTOLIC BLOOD PRESSURE: 112 MMHG | WEIGHT: 159.6 LBS

## 2020-11-30 DIAGNOSIS — F14.10: ICD-10-CM

## 2020-11-30 DIAGNOSIS — Z34.02 ENCOUNTER FOR SUPERVISION OF NORMAL FIRST PREGNANCY IN SECOND TRIMESTER: ICD-10-CM

## 2020-11-30 DIAGNOSIS — H05.20 EXOPHTHALMOS: Primary | ICD-10-CM

## 2020-11-30 DIAGNOSIS — O99.321: ICD-10-CM

## 2020-11-30 DIAGNOSIS — R79.89 LOW TSH LEVEL: ICD-10-CM

## 2020-11-30 PROCEDURE — 84443 ASSAY THYROID STIM HORMONE: CPT | Performed by: OBSTETRICS & GYNECOLOGY

## 2020-11-30 PROCEDURE — 99207 PR PRENATAL VISIT: CPT | Performed by: OBSTETRICS & GYNECOLOGY

## 2020-11-30 PROCEDURE — 36415 COLL VENOUS BLD VENIPUNCTURE: CPT | Performed by: OBSTETRICS & GYNECOLOGY

## 2020-11-30 NOTE — NURSING NOTE
"Chief Complaint   Patient presents with     Transferred OB Care     from [facility], patient is 25 weeks 0 days, no c/o VB, LoF, cramping, feeling FM daily.      Results     from US, patient needs to have recheck?        Initial /62   Wt 72.4 kg (159 lb 9.6 oz)   LMP 2020   BMI 26.15 kg/m   Estimated body mass index is 26.15 kg/m  as calculated from the following:    Height as of 10/8/20: 1.664 m (5' 5.5\").    Weight as of this encounter: 72.4 kg (159 lb 9.6 oz).  BP completed using cuff size: regular    Questioned patient about current smoking habits.  Pt.is former smoker          The following HM Due: NONE      Mikel Parks CMA               "

## 2020-11-30 NOTE — PROGRESS NOTES
Lavern is a 30 year old  at 25w0d here for new ob visit.      She is a KOSTA.  Desires to deliver at [facility], had some care previously at [place].    Closely spaced pregnancy, has a 2 year old and an 8 month old at home.  10 year old lives with her mom in [place]. Her two youngest kids are currently with CPS due to drug use, but are getting returned to her care tomorrow .  She has a history of cocaine use in the first trimester.  Underwent inpatient treatment from July - 2020, and has been clean since.  Still doing outpatient tx at [facility].   Smokes about 1/2 ppd.   Dep/anxiety managed on 25mg sertraline, has appt with psychiatrist today at [facility], sees Kelsy Thompson.  Feels like her dose doesn't totally manage her symptoms. Denies HI/SI.  Had been noted to have subclinical hyperthyroidism in 10/2020.      OB History    Para Term  AB Living   4 3 3 0 0 1   SAB TAB Ectopic Multiple Live Births   0 0 0 0 1      # Outcome Date GA Lbr Cesar/2nd Weight Sex Delivery Anes PTL Lv   4 Current            3 Term 20 37w0d   F       2 Term 18 38w0d   M       1 Term 08/18/10 40w0d  2.948 kg (6 lb 8 oz) M    JERILYN       ROS: Ten point review of systems was reviewed and negative except the above.    Past Medical History:   Diagnosis Date     Alcohol abuse      Asthma      Drug abuse (H)      Past Surgical History:   Procedure Laterality Date     HC INSERTION INTRAUTERINE DEVICE       HC REMOVE INTRAUTERINE DEVICE       Patient Active Problem List    Diagnosis Date Noted     Cocaine abuse affecting pregnancy in first trimester (H) 10/08/2020     Priority: Medium     In treatment through September.   UDS random       Rh negative state in antepartum period, second trimester 10/08/2020     Priority: Medium     Encounter for supervision of normal first pregnancy in second trimester 10/08/2020     Priority: Medium     Tobacco use disorder 10/29/2012      Priority: Medium     Moderate recurrent major depression (H) 2012     Priority: Medium     Anxiety state 2012     Priority: Medium     Problem list name updated by automated process. Provider to review        No Known Allergies       ferrous sulfate (FEROSUL) 325 (65 Fe) MG tablet, Take 325 mg by mouth daily (with breakfast)       ondansetron (ZOFRAN) 4 MG tablet, Take by mouth every 8 hours as needed for nausea       Prenatal Vit-Fe Fumarate-FA (PRENATAL MULTIVITAMIN W/IRON) 27-0.8 MG tablet, Take 1 tablet by mouth daily       sertraline (ZOLOFT) 25 MG tablet, Take 50 mg by mouth daily        QUEtiapine (SEROQUEL) 25 MG tablet, Take 12.5 mg by mouth 2 times daily    No current facility-administered medications on file prior to visit.       Physical Exam:   /62   Wt 72.4 kg (159 lb 9.6 oz)   LMP 2020   BMI 26.15 kg/m      Gen:  no acute distress, comfortable, smiling  HENT: No scleral injection or icterus. + exopthalmos  CV: Regular rate and rhythm, no m/g/r  Resp: Normal work of breathing, no cough  GI: Abdomen soft, non-tender. No masses, organomegaly  Skin: No suspicious lesions or rashes  Psychiatric: mentation appears normal and affect bright    Doptones 160s  FH 25cm    Lab Results   Component Value Date    ABO O 2020    RH Neg 2020       A/P 30 year old  at 25w0d here for NOB visit.  - Discussed physician coverage, tertiary support, diet, exercise, weight gain, schedule of visits, routine and indicated ultrasounds, and childbirth education.  Discussed MFM coverage.    Concerns:  - Oneg/RI.  FOB Patience.  It's a BOY.  Declines flu shot.  Plan for rhogam next visit.     - h/o cocaine use in first trimester.  S/p inpt tx -2020, now in outpatient tx with [facility].  Doing intermittent UDS screens with CPS due to just having her kids back.   Sober since July.      - tobacco use: Smokes about 1/2 ppd.  Recommended cutting down or quitting in pregnancy.     -  Dep/anxiety managed on 25mg sertraline, follows w psych at [facility]  Feels like her dose doesn't totally manage her symptoms. Denies HI/SI.    - Had been noted to have subclinical hyperthyroidism in 10/2020, exopthalmos noted today (tho vitals WNL), will recheck thyroid labs today.     - ventriculomegaly noted on routine anatomy screen.  MFM scan ordered today for follow-up     RTC 2 weeks    Michelle Wetzel MD, MPH  [facility]

## 2020-12-01 LAB — TSH SERPL DL<=0.005 MIU/L-ACNC: 0.67 MU/L (ref 0.4–4)

## 2020-12-02 ENCOUNTER — PATIENT OUTREACH (OUTPATIENT)
Dept: NURSING | Facility: CLINIC | Age: 30
End: 2020-12-02
Attending: OBSTETRICS & GYNECOLOGY
Payer: COMMERCIAL

## 2020-12-02 ENCOUNTER — PATIENT OUTREACH (OUTPATIENT)
Dept: CARE COORDINATION | Facility: CLINIC | Age: 30
End: 2020-12-02

## 2020-12-02 ASSESSMENT — ACTIVITIES OF DAILY LIVING (ADL): DEPENDENT_IADLS:: INDEPENDENT

## 2020-12-02 NOTE — PROGRESS NOTES
Clinic Care Coordination Contact    Clinic Care Coordination Contact  OUTREACH    Referral Information:  Referral Source: Specialist  Primary Diagnosis: Psychosocial    Patient declines Care Coordination at this time, but is agreeable to [facility] sending the Introduction to Care Coordination letter via [employer].   Full assessment not completed on these date and relies on chart review for completion.     Chief Complaint   Patient presents with     Clinic Care Coordination - Initial     Psychosocial        [place] Utilization: Reviewed on this date.   Difficulty keeping appointments: No  No-Show Concerns: No  No PCP office visit in Past Year: No  Utilization    Last refreshed: 12/1/2020  8:37 PM: Hospital Admissions 0           Last refreshed: 12/1/2020  8:37 PM: ED Visits 0           Last refreshed: 12/1/2020  8:37 PM: No Show Count (past year) 1              Current as of: 12/1/2020  8:37 PM            Clinical Concerns:  Current Medical Concerns:    Patient Active Problem List   Diagnosis     Moderate recurrent major depression (H)     Anxiety state     Tobacco use disorder     Cocaine abuse affecting pregnancy in first trimester (H)     Rh negative state in antepartum period, second trimester     Encounter for supervision of normal first pregnancy in second trimester       Current Behavioral Concerns: Patient is working with Psychiatry at [email or web address].     Education Provided to patient: Role of JUAN CARLOS PORTER and reason for outreach.    Health Maintenance Reviewed: Due/Overdue     Medication Management:  Not reviewed at this time.      Functional Status:  Dependent ADLs: Independent  Dependent IADLs: Independent  Bed or wheelchair confined: No  Mobility Status: Independent  Fallen 2 or more times in the past year?: No  Any fall with injury in the past year?: No    Living Situation:  Current living arrangement: I live in a private home with family  Type of residence: Apartment    Lifestyle & Psychosocial Needs:  Diet:  Regular  Inadequate nutrition (GOAL): No  Tube Feeding: No  Inadequate activity/exercise (GOAL): No  Significant changes in sleep pattern (GOAL): No        Mental health DX: Yes  Mental health DX how managed: Medication, Psychiatrist  Mental health management concern (GOAL): Yes  Chemical Dependency Status: Past Concern  Chemical Dependency Management: Other (see comment)  Informal Support system: Family   Socioeconomic History     Marital status: Single     Spouse name: Not on file     Number of children: 3     Years of education: Not on file     Highest education level: Not on file     Tobacco Use     Smoking status: Former Smoker     Packs/day: 0.50     Years: 4.00     Pack years: 2.00     Types: Cigarettes     Smokeless tobacco: Never Used   Substance and Sexual Activity     Alcohol use: Not Currently     Comment: Currently in outpatient treatment     Drug use: Not Currently     Comment: Currently in out patient treatment     Sexual activity: Yes     Partners: Male     Birth control/protection: None       Patient is working with CPS due to chemical dependency issues early in pregnancy. Patient is currently 25 weeks pregnant. Patient's children were returned to her custody yesterday.   Patient continues to work with [employer] and has been clean since July.     Patient is also working with [facility] Psych[facility] for medication management.      Resources and Interventions:  Community Resources: [employer], County Worker, Chemical Dependency Services, OP Mental Health,   Supplies used at home: None  Equipment Currently Used at Home: none    Advance Care Plan/Directive  Advanced Care Plans/Directives on file: No  Advanced Care Plan/Directive Status: Considering Options    Referrals Placed: None     Patient/Caregiver understanding: Pt reports understanding and denies any additional questions or concerns at this times. JUAN CARLOS CC engaged in AIDET communication during encounter.       Future  Appointments              In 3 weeks Heather Rodriguez MD [facility] [place], [place]          Plan: Introduction to Care Coordination sent to Patient via [employer] on this date. No further outreaches will be made at this time unless a new referral is made or a change in the pt's status occurs. Patient was provided with this writer's contact information and encouraged to call with any questions or concerns.    Theresa Valdivia, [facility]  Clinic Care Coordinator  Ph. [phone]  [email or web address]

## 2020-12-02 NOTE — PROGRESS NOTES
Clinic Care Coordination Contact  [facility]/Voicemail       Clinical Data: Care Coordinator Outreach  Outreach attempted x 1.  Left message on patient's voicemail with call back information and requested return call.  Plan: Care Coordinator will try to reach patient again in 1-2 business days.      Theresa Valdivia, [facility]  Clinic Care Coordinator  Ph. [phone]  [email or web address]

## 2020-12-02 NOTE — LETTER
[facility] CARE COORDINATION  [address] [place] [address]  December 2, 2020    Lavern Rodriguez  [address]  [place] [address]      Dear Lavern,    I am a clinic care coordinator who works at the [facility]. I wanted to thank you for spending the time to talk with me.  Below is a description of clinic care coordination and how I can further assist you.      The clinic care coordination team is made up of a registered nurse,  and community health worker who understand the health care system. The goal of clinic care coordination is to help you manage your health and improve access to the health care system in the most efficient manner. The team can assist you in meeting your health care goals by providing education, coordinating services, strengthening the communication among your providers and supporting you with any resource needs.    Please feel free to contact me at [phone] with any questions or concerns. We are focused on providing you with the highest-quality healthcare experience possible and that all starts with you.     Sincerely,         Theresa Valdivia, [facility]  Clinic Care Coordinator  Ph. [phone]  [email or web address]

## 2020-12-07 ENCOUNTER — PRE VISIT (OUTPATIENT)
Dept: MATERNAL FETAL MEDICINE | Facility: CLINIC | Age: 30
End: 2020-12-07

## 2020-12-11 ENCOUNTER — OFFICE VISIT (OUTPATIENT)
Dept: MATERNAL FETAL MEDICINE | Facility: CLINIC | Age: 30
End: 2020-12-11
Attending: OBSTETRICS & GYNECOLOGY
Payer: COMMERCIAL

## 2020-12-11 ENCOUNTER — HOSPITAL ENCOUNTER (OUTPATIENT)
Dept: ULTRASOUND IMAGING | Facility: CLINIC | Age: 30
End: 2020-12-11
Attending: OBSTETRICS & GYNECOLOGY
Payer: COMMERCIAL

## 2020-12-11 DIAGNOSIS — O35.9XX0 SUSPECTED ABNORMALITY OF FETUS AFFECTING MANAGEMENT OF MOTHER IN SINGLETON PREGNANCY: Primary | ICD-10-CM

## 2020-12-11 DIAGNOSIS — O26.90 PREGNANCY RELATED CONDITION, ANTEPARTUM: ICD-10-CM

## 2020-12-11 DIAGNOSIS — O40.9XX0 POLYHYDRAMNIOS AFFECTING PREGNANCY: ICD-10-CM

## 2020-12-11 PROCEDURE — 76811 OB US DETAILED SNGL FETUS: CPT

## 2020-12-11 PROCEDURE — 76811 OB US DETAILED SNGL FETUS: CPT | Mod: 26 | Performed by: OBSTETRICS & GYNECOLOGY

## 2020-12-11 PROCEDURE — 99201 PR OFFICE/OUTPT VISIT, NEW, LEVEL I: CPT | Mod: 25 | Performed by: OBSTETRICS & GYNECOLOGY

## 2020-12-11 NOTE — PROGRESS NOTES
The patient was seen for an ultrasound in the Maternal-Fetal Medicine Center at the [facility] today.  For a detailed report of the ultrasound examination, please see the ultrasound report which can be found under the imaging tab.    Lilibeth Mohr MD  , OB/GYN  Maternal-Fetal Medicine  [phone] (Pager)

## 2020-12-11 NOTE — NURSING NOTE
Patient presents to [facility] for L2/MFM consult at 26w4d due to possible ventriculomegaly on outside US and cocaine use in 1st trimester. She states she was seen at [facility] but recently transferred care to [facility]. Patient arrived to clinic with FOB and two children. We discussed that visitors are not allowed per our current COVID policy. FOB left the clinic with the two children. US was performed and patient stated she needed to leave and was unable to stay to meet with MD. Dr. Mohr will call patient to discuss US and do consult.    Grecia Meyers RN

## 2020-12-16 DIAGNOSIS — Z34.02 ENCOUNTER FOR SUPERVISION OF NORMAL FIRST PREGNANCY IN SECOND TRIMESTER: Primary | ICD-10-CM

## 2020-12-29 ENCOUNTER — PRENATAL OFFICE VISIT (OUTPATIENT)
Dept: OBGYN | Facility: CLINIC | Age: 30
End: 2020-12-29
Payer: COMMERCIAL

## 2020-12-29 VITALS — DIASTOLIC BLOOD PRESSURE: 60 MMHG | BODY MASS INDEX: 26.38 KG/M2 | WEIGHT: 161 LBS | SYSTOLIC BLOOD PRESSURE: 108 MMHG

## 2020-12-29 DIAGNOSIS — O26.893 RH NEGATIVE STATUS DURING PREGNANCY IN THIRD TRIMESTER: ICD-10-CM

## 2020-12-29 DIAGNOSIS — Z23 NEED FOR PROPHYLACTIC VACCINATION AND INOCULATION AGAINST INFLUENZA: ICD-10-CM

## 2020-12-29 DIAGNOSIS — O40.9XX0 POLYHYDRAMNIOS: ICD-10-CM

## 2020-12-29 DIAGNOSIS — Z67.91 RH NEGATIVE STATUS DURING PREGNANCY IN THIRD TRIMESTER: ICD-10-CM

## 2020-12-29 DIAGNOSIS — Z3A.29 29 WEEKS GESTATION OF PREGNANCY: Primary | ICD-10-CM

## 2020-12-29 LAB
BLD GP AB SCN SERPL QL: NORMAL
ERYTHROCYTE [DISTWIDTH] IN BLOOD BY AUTOMATED COUNT: 13.2 % (ref 10–15)
HCT VFR BLD AUTO: 28.8 % (ref 35–47)
HGB BLD-MCNC: 9.5 G/DL (ref 11.7–15.7)
MCH RBC QN AUTO: 31 PG (ref 26.5–33)
MCHC RBC AUTO-ENTMCNC: 33 G/DL (ref 31.5–36.5)
MCV RBC AUTO: 94 FL (ref 78–100)
PLATELET # BLD AUTO: 214 10E9/L (ref 150–450)
RBC # BLD AUTO: 3.06 10E12/L (ref 3.8–5.2)
WBC # BLD AUTO: 5.6 10E9/L (ref 4–11)

## 2020-12-29 PROCEDURE — 90471 IMMUNIZATION ADMIN: CPT | Performed by: OBSTETRICS & GYNECOLOGY

## 2020-12-29 PROCEDURE — 86850 RBC ANTIBODY SCREEN: CPT | Performed by: OBSTETRICS & GYNECOLOGY

## 2020-12-29 PROCEDURE — 86780 TREPONEMA PALLIDUM: CPT | Mod: 90 | Performed by: OBSTETRICS & GYNECOLOGY

## 2020-12-29 PROCEDURE — 96372 THER/PROPH/DIAG INJ SC/IM: CPT | Performed by: OBSTETRICS & GYNECOLOGY

## 2020-12-29 PROCEDURE — 90715 TDAP VACCINE 7 YRS/> IM: CPT | Performed by: OBSTETRICS & GYNECOLOGY

## 2020-12-29 PROCEDURE — 99207 PR PRENATAL VISIT: CPT | Performed by: OBSTETRICS & GYNECOLOGY

## 2020-12-29 PROCEDURE — 90686 IIV4 VACC NO PRSV 0.5 ML IM: CPT | Performed by: OBSTETRICS & GYNECOLOGY

## 2020-12-29 PROCEDURE — 90472 IMMUNIZATION ADMIN EACH ADD: CPT | Performed by: OBSTETRICS & GYNECOLOGY

## 2020-12-29 PROCEDURE — 99000 SPECIMEN HANDLING OFFICE-LAB: CPT | Performed by: OBSTETRICS & GYNECOLOGY

## 2020-12-29 PROCEDURE — 82950 GLUCOSE TEST: CPT | Performed by: OBSTETRICS & GYNECOLOGY

## 2020-12-29 PROCEDURE — 36415 COLL VENOUS BLD VENIPUNCTURE: CPT | Performed by: OBSTETRICS & GYNECOLOGY

## 2020-12-29 PROCEDURE — 85027 COMPLETE CBC AUTOMATED: CPT | Performed by: OBSTETRICS & GYNECOLOGY

## 2020-12-29 NOTE — NURSING NOTE
"Chief Complaint   Patient presents with     Prenatal Care     29 1/7 weeks   Clinic Administered Medication Documentation      Injectable Medication Documentation    Patient was given Rhogam. Prior to medication administration, verified patients identity using patient s name and date of birth. Please see MAR and medication order for additional information. Patient instructed to report any adverse reaction to staff immediately .      Was entire vial of medication used? Yes  Vial/Syringe: Syringe  Expiration Date:  3/21/22  NDC: 4621-5793-33  Lot: [id number]  Was this medication supplied by the patient? No      Initial /60   Wt 73 kg (161 lb)   LMP 2020   BMI 26.38 kg/m   Estimated body mass index is 26.38 kg/m  as calculated from the following:    Height as of 10/8/20: 1.664 m (5' 5.5\").    Weight as of this encounter: 73 kg (161 lb).  BP completed using cuff size: regular    Questioned patient about current smoking habits.  Pt. has never smoked.          The following HM Due: NONE    +fetal movement  -swelling    Teodora Blankenship CMA    "

## 2020-12-29 NOTE — PROGRESS NOTES
IUP at 29w1d,    Social:  - 1yo and 8 month old recently in CPS custody due to cocaine abuse, recently returned  - cocaine use in early pregnancy, clean since July s/p inpt treatment July-sept     Dep/anxiety: improved since adding 25mg seroquel still on 25mg sertraline  subclinical hyperthyroidism,  Labs within normal limits at first visit    Likely desires IOL at 39w.     Rhogam, GCT/CBC/Tdap today.     Return to clinic every 2w.     Heather Rodriguez MD

## 2020-12-30 LAB
GLUCOSE 1H P 50 G GLC PO SERPL-MCNC: 98 MG/DL (ref 60–129)
T PALLIDUM AB SER QL: NONREACTIVE

## 2020-12-30 NOTE — RESULT ENCOUNTER NOTE
Please call patient and ask her to start twice daily iron, over the counter.     Thanks,  Heather Rodriguez MD

## 2021-01-05 ENCOUNTER — OFFICE VISIT (OUTPATIENT)
Dept: MATERNAL FETAL MEDICINE | Facility: CLINIC | Age: 31
End: 2021-01-05
Attending: OBSTETRICS & GYNECOLOGY
Payer: COMMERCIAL

## 2021-01-05 ENCOUNTER — TELEPHONE (OUTPATIENT)
Dept: OBGYN | Facility: CLINIC | Age: 31
End: 2021-01-05

## 2021-01-05 ENCOUNTER — HOSPITAL ENCOUNTER (OUTPATIENT)
Dept: ULTRASOUND IMAGING | Facility: CLINIC | Age: 31
End: 2021-01-05
Attending: OBSTETRICS & GYNECOLOGY
Payer: COMMERCIAL

## 2021-01-05 DIAGNOSIS — O35.9XX0 SUSPECTED ABNORMALITY OF FETUS AFFECTING MANAGEMENT OF MOTHER IN SINGLETON PREGNANCY: ICD-10-CM

## 2021-01-05 DIAGNOSIS — O40.3XX0 POLYHYDRAMNIOS IN THIRD TRIMESTER COMPLICATION, SINGLE OR UNSPECIFIED FETUS: Primary | ICD-10-CM

## 2021-01-05 DIAGNOSIS — Z71.89 COUNSELING AND COORDINATION OF CARE: Primary | ICD-10-CM

## 2021-01-05 DIAGNOSIS — O40.9XX0 POLYHYDRAMNIOS AFFECTING PREGNANCY: ICD-10-CM

## 2021-01-05 PROCEDURE — 76816 OB US FOLLOW-UP PER FETUS: CPT

## 2021-01-05 PROCEDURE — 76816 OB US FOLLOW-UP PER FETUS: CPT | Mod: 26 | Performed by: OBSTETRICS & GYNECOLOGY

## 2021-01-05 NOTE — TELEPHONE ENCOUNTER
SERGEY calling: Continued Polyhydramnios  Patient needs follow up ultrasound for fluid check in 2 weeks.  Also due for routine OB visit.    Patient would like fewer visits so, SERGEY is asking if  is it ok for her to do the ultrasound with our department and see  in the same visit and location?   If so please sign order and we can call the patient to arrange appointment.    Also, patient states she has no one to watch her 2 children and would like to be approved to take children to her appointments.  She states she is unable to come to her appointments if she cannot have her children along.   Maria Del Rosario Pérez CMA

## 2021-01-05 NOTE — TELEPHONE ENCOUNTER
Ok to do prenatal visit and US on same day.  Order signed.    Michelle Wetzel MD, MPH  [facility] OB/Gyn

## 2021-01-05 NOTE — NURSING NOTE
[facility] called to make aware that patient desires to be seen in 2 weeks there for a follow up BURAK check due to polyhydramnios and OB appointment. RN states they will call the patient to get scheduled.

## 2021-01-05 NOTE — PROGRESS NOTES
Please see full imaging report from [facility] program under imaging tab.    Mild polyhydramnios.   Follow up in 2 weeks - see US report for full documentation.     Ross Quintana MD  Maternal Fetal Medicine

## 2021-01-05 NOTE — TELEPHONE ENCOUNTER
Patient is scheduled for two appts with /S.  TRES Hendrix is going to reach out to her regarding  and resources. Patient states she has to bring kids with her to appts. Gifty Dior said if Pedro Wetzel OKS this it it fine. However she cannot bring them with her when she has the baby.  Teodora Blankenship CMA

## 2021-01-06 ENCOUNTER — PATIENT OUTREACH (OUTPATIENT)
Dept: NURSING | Facility: CLINIC | Age: 31
End: 2021-01-06
Attending: OBSTETRICS & GYNECOLOGY

## 2021-01-06 ASSESSMENT — ACTIVITIES OF DAILY LIVING (ADL): DEPENDENT_IADLS:: INDEPENDENT

## 2021-01-06 NOTE — PROGRESS NOTES
Clinic Care Coordination Contact    Clinic Care Coordination Contact  OUTREACH    Referral Information:  Referral Source: Specialist  Primary Diagnosis: Psychosocial    Chief Complaint   Patient presents with     Clinic Care Coordination - Initial             [place] Utilization: Reviewed on this date.   Difficulty keeping appointments: No  No-Show Concerns: No  No PCP office visit in Past Year: No  Utilization    Last refreshed: 1/6/2021  1:15 AM: Hospital Admissions 0           Last refreshed: 1/6/2021  1:15 AM: ED Visits 0           Last refreshed: 1/6/2021  1:15 AM: No Show Count (past year) 1              Current as of: 1/6/2021  1:15 AM              Clinical Concerns:  Current Medical Concerns:    Patient Active Problem List   Diagnosis     Moderate recurrent major depression (H)     Anxiety state     Tobacco use disorder     Cocaine abuse affecting pregnancy in first trimester (H)     Rh negative state in antepartum period, second trimester     Encounter for supervision of normal first pregnancy in second trimester       Current Behavioral Concerns: No acute concerns.     Education Provided to patient: Role of  CC and reason for outreach.   Pain: No  Health Maintenance Reviewed: Due/Overdue   Clinical Pathway: None    Medication Management:  Not addressed during this assessment.      Functional Status:  Dependent ADLs: Independent  Dependent IADLs: Independent  Bed or wheelchair confined: No  Mobility Status: Independent  Fallen 2 or more times in the past year?: No  Any fall with injury in the past year?: No    Living Situation:  Current living arrangement: I live in a private home with family  Type of residence: Apartment    Lifestyle & Psychosocial Needs:  Diet: Regular  Inadequate nutrition : No  Tube Feeding: No  Inadequate activity/exercise : No  Significant changes in sleep pattern : No     Mental health DX: Yes  Mental health DX how managed: Medication, Psychiatrist  Mental health  management concern : Yes  Chemical Dependency Status: Past Concern  Chemical Dependency Management: Previous treatment  Informal Support system: Family   Socioeconomic History     Marital status: Single     Spouse name: Not on file     Number of children: 3     Years of education: Not on file     Highest education level: Not on file     Tobacco Use     Smoking status: Former Smoker     Packs/day: 0.50     Years: 4.00     Pack years: 2.00     Types: Cigarettes     Smokeless tobacco: Never Used   Substance and Sexual Activity     Alcohol use: Not Currently     Comment: Currently in outpatient treatment     Drug use: Not Currently     Comment: Currently in out patient treatment     Sexual activity: Yes     Partners: Male     Birth control/protection: None       Patient reports that the clinic has given her permission to bring her children with to her appointments, so she is not needing  assistance for those appointments.     Pt notes that because she is being induced, she can plan  ahead of time. She reports that family will be watching her children when she delivers. Patient identifies no need for additional services and resources at this time from Care Coordination.      Resources and Interventions:  Community Resources: County Programs, County Worker, Chemical Dependency Services,   Supplies used at home: None  Equipment Currently Used at Home: none    Advance Care Plan/Directive  Advanced Care Plans/Directives on file: No  Advanced Care Plan/Directive Status: Considering Options    Referrals Placed: None     Patient/Caregiver understanding: Pt reports understanding and denies any additional questions or concerns at this times. JUAN CARLOS CC engaged in AIDET communication during encounter.       Future Appointments              In 2 weeks Michelle Wetzel MD [facility] [place], [place]    In 2 weeks RIUS1 [place], [place]    In 1 month RI1   [place], [place]    In 1 month Michelle Wetzel MD [facility] [place], [place]          Plan: Patient is agreeable to SW CC sending a MyChart message with the Introduction to CC letter. No further outreaches will be made at this time unless a new referral is made or a change in the pt's status occurs. Patient was provided with this writer's contact information and encouraged to call with any questions or concerns.    Theresa Valdivia, [facility]  Clinic Care Coordinator  Ph. [phone]  [email or web address]

## 2021-01-06 NOTE — LETTER
[place] CARE COORDINATION  [address] [place] [address]  January 6, 2021    Lavern Rodriguez  [address]  [place] [address]      Dear Lavern,    I am a clinic care coordinator who works at the [facility]. I wanted to thank you for spending the time to talk with me. Below is a description of clinic care coordination and how I can further assist you if you are interested in the future.     The clinic care coordination team is made up of a registered nurse,  and community health worker who understand the health care system. The goal of clinic care coordination is to help you manage your health and improve access to the health care system in the most efficient manner. The team can assist you in meeting your health care goals by providing education, coordinating services, strengthening the communication among your providers and supporting you with any resource needs.    Please feel free to contact me at [phone] with any questions or concerns. We are focused on providing you with the highest-quality healthcare experience possible and that all starts with you.     Sincerely,       Theresa Valdivia, [facility]  Clinic Care Coordinator  Ph. [phone]  [email or web address]

## 2021-01-14 ENCOUNTER — TELEPHONE (OUTPATIENT)
Dept: OBGYN | Facility: CLINIC | Age: 31
End: 2021-01-14

## 2021-01-14 DIAGNOSIS — Z34.02 ENCOUNTER FOR SUPERVISION OF NORMAL FIRST PREGNANCY IN SECOND TRIMESTER: Primary | ICD-10-CM

## 2021-01-14 NOTE — TELEPHONE ENCOUNTER
Pt calls with low energy and lack of appetite.    Advised hgb was low and to try slow release iron as she doesn't want to take reg iron due to GI side effects.  Sent slo release iron in.  She will  vit c tabs too.    Kimi KIRKLAND R.N.

## 2021-01-15 ENCOUNTER — HEALTH MAINTENANCE LETTER (OUTPATIENT)
Age: 31
End: 2021-01-15

## 2021-01-22 ENCOUNTER — PRENATAL OFFICE VISIT (OUTPATIENT)
Dept: OBGYN | Facility: CLINIC | Age: 31
End: 2021-01-22
Attending: OBSTETRICS & GYNECOLOGY
Payer: COMMERCIAL

## 2021-01-22 ENCOUNTER — ANCILLARY PROCEDURE (OUTPATIENT)
Dept: ULTRASOUND IMAGING | Facility: CLINIC | Age: 31
End: 2021-01-22
Attending: OBSTETRICS & GYNECOLOGY
Payer: COMMERCIAL

## 2021-01-22 VITALS — BODY MASS INDEX: 26.38 KG/M2 | WEIGHT: 161 LBS | DIASTOLIC BLOOD PRESSURE: 60 MMHG | SYSTOLIC BLOOD PRESSURE: 106 MMHG

## 2021-01-22 DIAGNOSIS — O40.3XX0 POLYHYDRAMNIOS IN THIRD TRIMESTER COMPLICATION, SINGLE OR UNSPECIFIED FETUS: ICD-10-CM

## 2021-01-22 DIAGNOSIS — O40.3XX0 POLYHYDRAMNIOS IN THIRD TRIMESTER COMPLICATION, SINGLE OR UNSPECIFIED FETUS: Primary | ICD-10-CM

## 2021-01-22 PROCEDURE — 76816 OB US FOLLOW-UP PER FETUS: CPT | Performed by: OBSTETRICS & GYNECOLOGY

## 2021-01-22 PROCEDURE — 99207 PR PRENATAL VISIT: CPT | Performed by: OBSTETRICS & GYNECOLOGY

## 2021-01-22 NOTE — PROGRESS NOTES
30 year old  at 32w4d     - CPS involvement: 4yo and 8 month old recently in CPS custody due to cocaine abuse, recently returned  - cocaine use in early pregnancy, clean since July s/p inpt treatment July-sept   -Dep/anxiety: improved since adding 25mg seroquel still on 25mg sertraline  -subclinical hyperthyroidism,  Labs WNL at first visit  - mild polyhydramnios, q2w US, repeat planned for this afternoon.  If mild poly would plan for IOL after 39 wks per pt request, if severe poly may offer earlier induction     RTC 2 wks     Michelle Wetzel MD, MPH  [facility]

## 2021-01-22 NOTE — NURSING NOTE
"Chief Complaint   Patient presents with     Prenatal Care     32 4/7 weeks       Initial /60   Wt 73 kg (161 lb)   LMP 2020   BMI 26.38 kg/m   Estimated body mass index is 26.38 kg/m  as calculated from the following:    Height as of 10/8/20: 1.664 m (5' 5.5\").    Weight as of this encounter: 73 kg (161 lb).  BP completed using cuff size: regular    Questioned patient about current smoking habits.  Pt. has never smoked.          The following HM Due: NONE    +fetal movement    Teodora Blankenship, CMA    "

## 2021-02-12 ENCOUNTER — PRENATAL OFFICE VISIT (OUTPATIENT)
Dept: OBGYN | Facility: CLINIC | Age: 31
End: 2021-02-12
Payer: COMMERCIAL

## 2021-02-12 VITALS — SYSTOLIC BLOOD PRESSURE: 100 MMHG | WEIGHT: 165 LBS | BODY MASS INDEX: 27.04 KG/M2 | DIASTOLIC BLOOD PRESSURE: 60 MMHG

## 2021-02-12 DIAGNOSIS — O40.9XX0 POLYHYDRAMNIOS: ICD-10-CM

## 2021-02-12 DIAGNOSIS — Z34.90 ENCOUNTER FOR SUPERVISION OF NORMAL PREGNANCY, ANTEPARTUM, UNSPECIFIED GRAVIDITY: Primary | ICD-10-CM

## 2021-02-12 PROCEDURE — 99207 PR PRENATAL VISIT: CPT | Performed by: OBSTETRICS & GYNECOLOGY

## 2021-02-12 PROCEDURE — 76816 OB US FOLLOW-UP PER FETUS: CPT | Performed by: OBSTETRICS & GYNECOLOGY

## 2021-02-12 NOTE — PROGRESS NOTES
30 year old  at 35w4d     - Delivery planning discussed today: desires IOL after 39 weeks, is concerned that if she goes spontaneously before that, she won't have anyone to care for her children (CPS involvement: 1yo and 10 month old were in CPS custody due to cocaine abuse, recently returned).  CPS worker is Kylah De La Cruz [phone],  Yuliana Rowley.  Will contact L&D to inquire about delivery planning. Care coordination to be involved as well.   - desires nexplanon placement prior to hospital discharge: would plan for depo provera immediately postpartum and placement of nexplanon in clinic as soon as pt can come to clinic postpartum (would be happy to proceed with nexplanon placement in the hospital if this were allowed)   - cocaine use in early pregnancy, clean since July s/p inpt treatment July-sept   -Dep/anxiety: improved since adding 25mg seroquel still on 25mg sertraline  - subclinical hyperthyroidism,  Labs WNL at first visit  - mild polyhydramnios, resolved on last 2 ultrasounds.      RTC weekly until delivery , GBS and cvx chk next visit    Michelle Wetzel MD, MPH  [facility] OB/Gyn

## 2021-02-12 NOTE — NURSING NOTE
"Chief Complaint   Patient presents with     Prenatal Care     35 4/7 WEEKS       Initial /60   Wt 74.8 kg (165 lb)   LMP 2020   BMI 27.04 kg/m   Estimated body mass index is 27.04 kg/m  as calculated from the following:    Height as of 10/8/20: 1.664 m (5' 5.5\").    Weight as of this encounter: 74.8 kg (165 lb).  BP completed using cuff size: large    Questioned patient about current smoking habits.  Pt. has never smoked.          The following HM Due: NONE  +fetal movement  -swelling  ++++dry hands    Teodora Blankenship, CMA    "

## 2021-02-15 ENCOUNTER — PATIENT OUTREACH (OUTPATIENT)
Dept: NURSING | Facility: CLINIC | Age: 31
End: 2021-02-15
Attending: OBSTETRICS & GYNECOLOGY

## 2021-02-15 ASSESSMENT — ACTIVITIES OF DAILY LIVING (ADL): DEPENDENT_IADLS:: INDEPENDENT

## 2021-02-15 NOTE — PROGRESS NOTES
Clinic Care Coordination Contact    Clinic Care Coordination Contact  OUTREACH    Referral Information:  Referral Source: Specialist  Primary Diagnosis: Psychosocial    Chief Complaint   Patient presents with     Clinic Care Coordination - Initial             Clinical Concerns:  Current Medical Concerns:    Patient Active Problem List   Diagnosis     Moderate recurrent major depression (H)     Anxiety state     Tobacco use disorder     Cocaine abuse affecting pregnancy in first trimester (H)     Rh negative state in antepartum period, second trimester     Encounter for supervision of normal first pregnancy in second trimester       Education Provided to patient: Role of SW CC and reason for outreach.    Health Maintenance Reviewed: Not assessed     SW CC introduced role and reason for calling. Pt confirms that she has applied for  assistance, but the application is processing. We discussed the option of the crisis nursery if Pt is unable to coordinate  when she delivers. Pt then requests that SW CC call back tomorrow. Unable to complete full assessment at this time.     Resources and Interventions:  Community Resources: County Programs, County Worker, Chemical Dependency Services,   Supplies used at home:: None  Equipment Currently Used at Home: none    Advance Care Plan/Directive  Advanced Care Plans/Directives on file: No  Advanced Care Plan/Directive Status: Considering Options  Referrals Placed: None     Patient/Caregiver understanding: Pt reports understanding and denies any additional questions or concerns at this times. SW CC engaged in AIDET communication during encounter.     Future Appointments              In 4 days Michelle Wetzel MD [facility] Women[facility] [place], [place]          Plan: SW CC will outreach to Patient tomorrow to complete the full CC assessment and to offer  resources.    Theresa Valdivia [facility]  Clinic Care  Coordinator  Doris. [phone]  [email or web address]

## 2021-02-16 ENCOUNTER — PATIENT OUTREACH (OUTPATIENT)
Dept: NURSING | Facility: CLINIC | Age: 31
End: 2021-02-16

## 2021-02-16 ASSESSMENT — ACTIVITIES OF DAILY LIVING (ADL): DEPENDENT_IADLS:: INDEPENDENT

## 2021-02-16 NOTE — LETTER
[facility] CARE COORDINATION  [address] [place] [address]  February 19, 2021    Lavern Rodriguez  [address]  [place] [address]      Dear Lavern,    I am a clinic care coordinator who works at the [facility] OB/GYN office.  I wanted to thank you for taking the time to speak with me over the phone earlier this week. As we discussed, I've included a few  resources that may be beneficial to you. Hopefully your  will also be a good resource in making a plan for  when you deliver.    -[facility] Crisis Nursery: Crisis Nursery is here to support families 24/7 during this stressful time. If you need someone to talk to, are looking for resources or are in need of care for your children please contact us at [phone].   [email or web address]    -Together for Good: The Together for Good network: a team of trained volunteers and professional staff who come alongside parents facing chronic stress or crisis.  Ph. [phone].  https://[employer].org/    -Safe Families: Safe Families for Children is a network of families who volunteer to care for your children for as long as you need. Our goal is to keep your family intact. Once you feel like you and your home environment is stable and healthy we ll work with you to reunite your family as soon as possible.  [address]  [place] [address]  Phone: [phone]  Email: [email or web address]@[email or web address]  https://safe-families.org/about/    Please feel free to contact me at [phone] with any questions or concerns. We are focused on providing you with the highest-quality healthcare experience possible and that all starts with you.     Sincerely,         Theresa Valdivia, [facility]  Clinic Care Coordinator  Ph. [phone]  [email or web address]      Enclosed: I have enclosed a copy of the Complex Care Plan. This has helpful information and goals that we have talked  about. Please keep this in an easy to access place to use as needed.

## 2021-02-16 NOTE — LETTER
[facility]  Complex Care Plan  About Me:    Patient Name:  Lavern Rodriguez    YOB: 1990  Age:         30 year old   [facility] MRN:    [id number] Telephone Information:  Home Phone [phone]   Mobile [phone]       Address:  [address] [place] [address]  [place] [address] Email address:  [email or web address]      Emergency Contact(s)    Name Relationship Lgl Grd Work Phone Home Phone Mobile Phone   1ORLY ALARCON Mother   [phone]            Primary language:  English     needed? No   [facility] [employer] [facility]:  [phone] op. 1  Other communication barriers: None  Preferred Method of Communication:   Phone  Current living arrangement: I live in a private home with family  Mobility Status/ Medical Equipment: Independent    Health Maintenance  Health Maintenance Reviewed: Due/Overdue   Health Maintenance Due   Topic Date Due     ADVANCE CARE PLANNING  1990     PREVENTIVE CARE VISIT  09/25/2013     MATERNAL SCREENING  09/21/2020     PAP  08/05/2021       My Access Plan  Medical Emergency 911   Primary Clinic Line [facility] [phone]   24 Hour Appointment Line [phone] or  [phone]) (toll-free)   24 Hour Nurse Line [phone] (toll-free)   Preferred Urgent Care     Preferred Hospital [facility]  [phone]   Preferred Pharmacy [facility] [place] [facility] - [place], [place] - [address]     Behavioral Health Crisis Line The National Suicide Prevention Lifeline at 1-272.855.1464 or 911       My Care Team Members  Patient Care Team       Relationship Specialty Notifications [facility] PCP - General   10/15/17     PT STATES [address]ES [address] [place]    Phone: [phone] Fax: [phone]         [address] [facility] [address]    Michelle Wetzel MD Assigned OBGYN Provider   1/31/21     Phone: [phone] Fax: [phone]  [address] [facility] [address]    Theresa Palomares LGSW Lead Care Coordinator   2/15/21             My Care Plans  Self Management and Treatment Plan  Goals and (Comments)  Goals        General    1. Psychosocial (pt-stated)     Notes - Note created  2/19/2021  3:45 PM by Theresa Palomares LGSW    Goal Statement: I want to have  for my children coordinated when I deliver.  Date Goal set: 2.19.2021  Barriers: Navigating programs/availability of support, unclear delivery date.  Strengths: Recognition of need, CPS  involvement.   Date to Achieve By: 3.19.2021  Patient expressed understanding of goal: Pt reports understanding and denies any additional questions or concerns at this times. JUAN CARLOS PORTER engaged in AIDET communication during encounter.    Action steps to achieve this goal:  1. I will review resources sent by JUAN CARLOS PORTER.   2. I will continue to collaborate with [facility] .   3. I will make a  plan and follow-through at delivery.                My Medical and Care Information  Problem List   Patient Active Problem List   Diagnosis     Moderate recurrent major depression (H)     Anxiety state     Tobacco use disorder     Cocaine abuse affecting pregnancy in first trimester (H)     Rh negative state in antepartum period, second trimester     Encounter for supervision of normal first pregnancy in second trimester      Current Medications and Allergies:  See printed Medication Report.  Current Outpatient Medications   Medication Instructions     Prenatal Vit-Fe Fumarate-FA (PRENATAL MULTIVITAMIN W/IRON) 27-0.8 MG tablet 1 tablet, Oral, DAILY     sertraline (ZOLOFT) 50 mg, Oral, DAILY       Care Coordination Start Date: 2/16/2021   Frequency of Care Coordination: weekly   Form Last Updated: 02/19/2021     ELIER Amato  Clinic Care Coordinator  Ph. [phone]  [email or web address]

## 2021-02-19 ENCOUNTER — PRENATAL OFFICE VISIT (OUTPATIENT)
Dept: OBGYN | Facility: CLINIC | Age: 31
End: 2021-02-19
Payer: COMMERCIAL

## 2021-02-19 VITALS — SYSTOLIC BLOOD PRESSURE: 104 MMHG | BODY MASS INDEX: 27.2 KG/M2 | WEIGHT: 166 LBS | DIASTOLIC BLOOD PRESSURE: 60 MMHG

## 2021-02-19 DIAGNOSIS — Z36.85 ANTENATAL SCREENING FOR STREPTOCOCCUS B: Primary | ICD-10-CM

## 2021-02-19 DIAGNOSIS — N89.8 VAGINAL DISCHARGE: ICD-10-CM

## 2021-02-19 LAB
SPECIMEN SOURCE: NORMAL
WET PREP SPEC: NORMAL

## 2021-02-19 PROCEDURE — 87210 SMEAR WET MOUNT SALINE/INK: CPT | Performed by: OBSTETRICS & GYNECOLOGY

## 2021-02-19 PROCEDURE — 87653 STREP B DNA AMP PROBE: CPT | Performed by: OBSTETRICS & GYNECOLOGY

## 2021-02-19 PROCEDURE — 99207 PR PRENATAL VISIT: CPT | Performed by: OBSTETRICS & GYNECOLOGY

## 2021-02-19 NOTE — NURSING NOTE
"Chief Complaint   Patient presents with     Prenatal Care     36 4/7 weeks       Initial /60   Wt 75.3 kg (166 lb)   LMP 2020   BMI 27.20 kg/m   Estimated body mass index is 27.2 kg/m  as calculated from the following:    Height as of 10/8/20: 1.664 m (5' 5.5\").    Weight as of this encounter: 75.3 kg (166 lb).  BP completed using cuff size: large    Questioned patient about current smoking habits.  Pt. has never smoked.          The following HM Due: NONE  Teodora Blankenship, CMA    +fetal movement  -swelling      "

## 2021-02-19 NOTE — PROGRESS NOTES
30 year old  at 36w4d     - GBS and wet prep today  - Delivery planning discussed today: desires IOL after 39 weeks, care coordination is involved.  Can plan for IOL on 3/8 at 39 weeks, will schedule at 38 week visit  - desires nexplanon placement prior to hospital discharge: would plan for depo provera immediately postpartum and placement of nexplanon in clinic as soon as pt can come to clinic postpartum (would be happy to proceed with nexplanon placement in the hospital if this were allowed)   - mild polyhydramnios, resolved on last 2 ultrasounds.      RTC weekly until delivery     Michelle Wetzel MD, MPH  [facility] OB/Gyn

## 2021-02-19 NOTE — PROGRESS NOTES
Clinic Care Coordination Contact    Clinic Care Coordination Contact  OUTREACH    Referral Information:  Referral Source: Specialist  Primary Diagnosis: Psychosocial    Chief Complaint   Patient presents with     Clinic Care Coordination - Initial     Resource navigation        Universal Utilization: Reviewed on this date.   Difficulty keeping appointments: No  No-Show Concerns: No  No PCP office visit in Past Year: No  Utilization    Last refreshed: 2/19/2021  3:41 PM: Hospital Admissions 0           Last refreshed: 2/19/2021  3:41 PM: ED Visits 0           Last refreshed: 2/19/2021  3:41 PM: No Show Count (past year) 1              Current as of: 2/19/2021  3:41 PM            Clinical Concerns:  Current Medical Concerns:    Patient Active Problem List   Diagnosis     Moderate recurrent major depression (H)     Anxiety state     Tobacco use disorder     Cocaine abuse affecting pregnancy in first trimester (H)     Rh negative state in antepartum period, second trimester     Encounter for supervision of normal first pregnancy in second trimester       Current Behavioral Concerns: None noted.     Education Provided to patient: Role of SW CC and reason for outreach.    Pain : No  Health Maintenance Reviewed: Due/Overdue     Medication Management:  Independent.      Functional Status:  Dependent ADLs: Independent  Dependent IADLs: Independent  Bed or wheelchair confined: No  Mobility Status: Independent  Fallen 2 or more times in the past year?: No  Any fall with injury in the past year?: No    Living Situation:  Current living arrangement: I live in a private home with family  Type of residence: Apartment    Lifestyle & Psychosocial Needs:  Diet: Regular  Inadequate nutrition : No  Tube Feeding: No  Inadequate activity/exercise : No  Significant changes in sleep pattern : No     Alevism or spiritual beliefs that impact treatment: No  Mental health DX: Yes  Mental health DX how managed: Medication,  Psychiatrist  Mental health management concern : Yes  Informal Support system: Family   Socioeconomic History     Marital status: Single     Spouse name: Not on file     Number of children: 3     Years of education: Not on file     Highest education level: Not on file     Tobacco Use     Smoking status: Former Smoker     Packs/day: 0.50     Years: 4.00     Pack years: 2.00     Types: Cigarettes     Smokeless tobacco: Never Used   Substance and Sexual Activity     Alcohol use: Not Currently     Comment: Currently in outpatient treatment     Drug use: Not Currently     Comment: Currently in out patient treatment     Sexual activity: Yes     Partners: Male     Birth control/protection: None     Patient reports that she is concerned about having a  plan if she delivers before she induced. Pt reports that she spoke with her CPS worker, and that the CPS worker will pay for respite and coordinate transportation to her friend's home when she is induced. Pt is wondering if she could deliver at [place], and will discuss with OB/GYN at Friday appointment.     Patient agreeable to  CC sending  resources and crisis nursery info in case Pt delivers before she plans to be induced on March 8th. Patient will also continue to collaborate with the CPS worker to make an appropriate plan.      Resources and Interventions:  Community Resources: County Programs, County Worker, Chemical Dependency Services,   Supplies used at home: None  Equipment Currently Used at Home: none    Advance Care Plan/Directive  Advanced Care Plans/Directives on file: No  Advanced Care Plan/Directive Status: Considering Options    Referrals Placed:  resources.      Goals:   Goals        General    1. Psychosocial (pt-stated)     Notes - Note created  2/19/2021  3:45 PM by Theresa Palomares, ELIER    Goal Statement: I want to have  for my children coordinated when I deliver.  Date Goal set:  2.19.2021  Barriers: Navigating programs/availability of support, unclear delivery date.  Strengths: Recognition of need, CPS  involvement.   Date to Achieve By: 3.19.2021  Patient expressed understanding of goal: Pt reports understanding and denies any additional questions or concerns at this times. SW CC engaged in AIDET communication during encounter.    Action steps to achieve this goal:  1. I will review resources sent by SW CC.   2. I will continue to collaborate with CPS .   3. I will make a  plan and follow-through at delivery.             Patient/Caregiver understanding: Pt reports understanding and denies any additional questions or concerns at this times. SW CC engaged in AIDET communication during encounter.    Outreach Frequency: weekly  Future Appointments              In 1 week Michelle Wetzel MD [facility] [place], [place]          Plan: Introduction to CC letter with resources and Complex Care Plan sent to Patient via [employer] on this date. SW CC will follow-up with Patient in one week to address goal progression and delivery plan.     Theresa Valdivia [facility]  Clinic Care Coordinator  Ph. [phone]  [email or web address]

## 2021-02-20 LAB
GP B STREP DNA SPEC QL NAA+PROBE: NEGATIVE
SPECIMEN SOURCE: NORMAL

## 2021-02-23 ENCOUNTER — PATIENT OUTREACH (OUTPATIENT)
Dept: CARE COORDINATION | Facility: CLINIC | Age: 31
End: 2021-02-23

## 2021-02-23 ENCOUNTER — PATIENT OUTREACH (OUTPATIENT)
Dept: NURSING | Facility: CLINIC | Age: 31
End: 2021-02-23

## 2021-02-23 NOTE — PROGRESS NOTES
Clinic Care Coordination Contact  [facility]/Voicemail       Clinical Data: Care Coordinator Outreach  Outreach attempted x 1.  Left message on patient's voicemail with call back information and requested return call.  Plan: Care Coordinator will try to reach patient again in 1-2 business days.      Theresa Valdivia, [facility]  Clinic Care Coordinator  Ph. [phone]  [email or web address]

## 2021-02-23 NOTE — PROGRESS NOTES
Clinic Care Coordination Contact    Follow Up Progress Note      Assessment: Call placed to confirm receipt of delivery of resources via [employer] and discuss delivery and childcare plan.     Goals addressed this encounter:   Goals Addressed                 This Visit's Progress       Patient Stated      1. Psychosocial (pt-stated)   80%     Goal Statement: I want to have  for my children coordinated when I deliver.  Date Goal set: 2.19.2021  Barriers: Navigating programs/availability of support, unclear delivery date.  Strengths: Recognition of need, CPS  involvement.   Date to Achieve By: 3.19.2021  Patient expressed understanding of goal: Pt reports understanding and denies any additional questions or concerns at this times. JUAN CARLOS PORTER engaged in AIDET communication during encounter.    Action steps to achieve this goal:  1. I will review resources sent by JUAN CARLOS PORTER.   2. I will continue to collaborate with CPS .   3. I will make a  plan and follow-through at delivery.              Intervention/Education provided during outreach: Patient reports that she will be induced on 3/8. Pt will communicate with her CPS worker if Pt goes into labor and has a plan to drop off children with the CPS worker the morning of being induced.     Plan:   JUAN CARLOS PORTER encouraged Pt to continue to collaborate with her CPS worker and to contact CC with additional needs. Pt is in agreement with this plan. JUAN CARLOS PORTER will follow-up in 2 weeks.     Theresa Valdivia [facility]  Clinic Care Coordinator  Ph. [phone]  [email or web address]

## 2021-02-25 ENCOUNTER — PATIENT OUTREACH (OUTPATIENT)
Dept: NURSING | Facility: CLINIC | Age: 31
End: 2021-02-25

## 2021-02-25 NOTE — LETTER
[facility] CARE COORDINATION  [address] [place] [address]  February 25, 2021    Lavern Rodriguez  [address] [address]      Dear Lavern,    I am a clinic care coordinator at the [facility]. As we discussed, I've put together resources that may be helpful to you. Unfortunately, the COVID-19 Rental Assistance Program is now closed, and the [facility] CDA does not offer one-time assistance for rent.     -[facility] Crisis Funds and Emergency Assistance:  Crisis Funds are available through [facility]'s Income Maintenance Department. The program provides short-term help for families (including minor children or pregnant woman) related to unexpected emergency expenses, such as an eviction or utility disconnect.  Crisis Funds can only be issued once in a 3-year period.   If you want to see if you meet the program requirements, please call [phone].  Emergency General Assistance (EGA) can provide short-term help for qualifying adults faced with an unexpected emergency expense, such as an eviction or utility disconnect. EGA can only be issued once in a 12-month period. If you want to see if you meet the program requirements, please call [phone].    -[employer]: Programs range from homeless shelters to transitional housing, rent money (in limited qualities when available), loans, furniture, and more.  [address] [place] [address]  [phone]    Please feel free to contact me at [phone] with any questions or concerns. We are focused on providing you with the highest-quality healthcare experience possible and that all starts with you.     Sincerely,       Theresa Valdivia, [facility]  Clinic Care Coordinator  Ph. [phone]  [email or web address]

## 2021-02-25 NOTE — PROGRESS NOTES
Clinic Care Coordination Contact    Follow Up Progress Note      Assessment: Voicemail received from Patient requesting a call back. Call placed to Pt. She is wondering if there are rental assistance resources available to her.     Goals addressed this encounter:   Goals Addressed                 This Visit's Progress       Patient Stated      1. Psychosocial (pt-stated)   80%     Goal Statement: I want to have  for my children coordinated when I deliver.  Date Goal set: 2.19.2021  Barriers: Navigating programs/availability of support, unclear delivery date.  Strengths: Recognition of need, CPS  involvement.   Date to Achieve By: 3.19.2021  Patient expressed understanding of goal: Pt reports understanding and denies any additional questions or concerns at this times. JUAN CARLOS PORTER engaged in AIDET communication during encounter.    Action steps to achieve this goal:  1. I will review resources sent by JUAN CARLOS PORTER.   2. I will continue to collaborate with [facility] .   3. I will make a  plan and follow-through at delivery.              Intervention/Education provided during outreach: We discussed that JUAN CARLOS PORTER would research options/resources and would send them via [employer]. Patient is in agreement with this plan. The following resources were sent to Patient on this date:  -[facility]       Outreach Frequency: monthly    Plan:   JUAN CARLOS PORTER will follow-up as previously planned in 2 weeks to address goal progression.       Theresa Valdivia, [facility]  Clinic Care Coordinator  Ph. [phone]  [email or web address]

## 2021-03-01 ENCOUNTER — PRENATAL OFFICE VISIT (OUTPATIENT)
Dept: OBGYN | Facility: CLINIC | Age: 31
End: 2021-03-01
Payer: COMMERCIAL

## 2021-03-01 VITALS — SYSTOLIC BLOOD PRESSURE: 110 MMHG | WEIGHT: 168 LBS | BODY MASS INDEX: 27.53 KG/M2 | DIASTOLIC BLOOD PRESSURE: 60 MMHG

## 2021-03-01 DIAGNOSIS — Z34.90 ENCOUNTER FOR INDUCTION OF LABOR: Primary | ICD-10-CM

## 2021-03-01 PROCEDURE — 59426 ANTEPARTUM CARE ONLY: CPT | Performed by: OBSTETRICS & GYNECOLOGY

## 2021-03-01 PROCEDURE — 99207 PR PRENATAL VISIT: CPT | Performed by: OBSTETRICS & GYNECOLOGY

## 2021-03-01 NOTE — PROGRESS NOTES
30 year old  at 38w0d     - Delivery planning: desires IOL after 39 weeks, care coordination is involved.  Scheduled for 0730 on 3/8.  - birth control plan: plan for depo provera immediately postpartum and placement of nexplanon on 3/15 in clinic   - mild polyhydramnios, resolved on last 2 ultrasounds.       Michelle Wetzel MD, MPH  [facility] OB/Gyn

## 2021-03-01 NOTE — NURSING NOTE
"Chief Complaint   Patient presents with     Prenatal Care     38 weeks       Initial /60   Wt 76.2 kg (168 lb)   LMP 2020   BMI 27.53 kg/m   Estimated body mass index is 27.53 kg/m  as calculated from the following:    Height as of 10/8/20: 1.664 m (5' 5.5\").    Weight as of this encounter: 76.2 kg (168 lb).  BP completed using cuff size: regular    Questioned patient about current smoking habits.  Pt.   has never smoked.        The following HM Due: NONE    +fetal movement  -swelling    Teodora Blankenship, CMA    "

## 2021-03-04 ENCOUNTER — PATIENT OUTREACH (OUTPATIENT)
Dept: NURSING | Facility: CLINIC | Age: 31
End: 2021-03-04
Payer: COMMERCIAL

## 2021-03-04 DIAGNOSIS — Z34.90 ENCOUNTER FOR INDUCTION OF LABOR: ICD-10-CM

## 2021-03-04 LAB
SARS-COV-2 RNA RESP QL NAA+PROBE: NORMAL
SPECIMEN SOURCE: NORMAL

## 2021-03-04 PROCEDURE — U0005 INFEC AGEN DETEC AMPLI PROBE: HCPCS | Performed by: OBSTETRICS & GYNECOLOGY

## 2021-03-04 PROCEDURE — U0003 INFECTIOUS AGENT DETECTION BY NUCLEIC ACID (DNA OR RNA); SEVERE ACUTE RESPIRATORY SYNDROME CORONAVIRUS 2 (SARS-COV-2) (CORONAVIRUS DISEASE [COVID-19]), AMPLIFIED PROBE TECHNIQUE, MAKING USE OF HIGH THROUGHPUT TECHNOLOGIES AS DESCRIBED BY CMS-2020-01-R: HCPCS | Performed by: OBSTETRICS & GYNECOLOGY

## 2021-03-04 NOTE — PROGRESS NOTES
Clinic Care Coordination Contact    Follow Up Progress Note      Assessment: Voicemail received from Patient requesting a call back to ask a question. Call placed to Patient, and she is wondering about transportation when she is discharged from the hospital. Pt will be induced on Monday.     Goals addressed this encounter:   Goals Addressed                 This Visit's Progress       Patient Stated      1. Psychosocial (pt-stated)   90%     Goal Statement: I want to have  for my children coordinated when I deliver.  Date Goal set: 2.19.2021  Barriers: Navigating programs/availability of support, unclear delivery date.  Strengths: Recognition of need, CPS  involvement.   Date to Achieve By: 3.19.2021  Patient expressed understanding of goal: Pt reports understanding and denies any additional questions or concerns at this times. JUAN CARLOS PORTER engaged in AIDET communication during encounter.    Action steps to achieve this goal:  1. I will review resources sent by JUAN CARLOS PORTER.   2. I will continue to collaborate with CPS .   3. I will make a  plan and follow-through at delivery.              Intervention/Education provided during outreach: We reviewed that Pt's health insurance will cover discharge transportation, and that the inpatient care coordination team will assist with coordinating a ride. JUAN CARLOS PORTER encouraged Pt to inform staff when she arrives that she will need assistance coordinating transportation when she is being discharged. Pt is in agreement with this plan.      Outreach Frequency: monthly    Plan:   JUAN CARLOS PORTER will remain available for CC needs and will follow-up with Patient following discharge. Patient will be induced on Monday.     Theresa Valdivia [facility]  Clinic Care Coordinator  Ph. [phone]  [email or web address]

## 2021-03-05 LAB
LABORATORY COMMENT REPORT: NORMAL
SARS-COV-2 RNA RESP QL NAA+PROBE: NEGATIVE
SPECIMEN SOURCE: NORMAL

## 2021-03-07 ENCOUNTER — HOSPITAL ENCOUNTER (INPATIENT)
Facility: CLINIC | Age: 31
LOS: 2 days | Discharge: HOME | End: 2021-03-09
Attending: FAMILY MEDICINE | Admitting: FAMILY MEDICINE
Payer: COMMERCIAL

## 2021-03-07 ENCOUNTER — ANESTHESIA EVENT (OUTPATIENT)
Dept: OBGYN | Facility: CLINIC | Age: 31
End: 2021-03-07
Payer: COMMERCIAL

## 2021-03-07 ENCOUNTER — ANESTHESIA (OUTPATIENT)
Dept: OBGYN | Facility: CLINIC | Age: 31
End: 2021-03-07
Payer: COMMERCIAL

## 2021-03-07 ENCOUNTER — NURSE TRIAGE (OUTPATIENT)
Dept: NURSING | Facility: CLINIC | Age: 31
End: 2021-03-07

## 2021-03-07 PROBLEM — Z36.89 ENCOUNTER FOR TRIAGE IN PREGNANT PATIENT: Status: ACTIVE | Noted: 2021-03-07

## 2021-03-07 LAB
AMPHETAMINES UR QL SCN: NEGATIVE
BASOPHILS # BLD AUTO: 0 10E9/L (ref 0–0.2)
BASOPHILS NFR BLD AUTO: 0.3 %
CANNABINOIDS UR QL: NEGATIVE
COCAINE UR QL: NEGATIVE
DIFFERENTIAL METHOD BLD: ABNORMAL
EOSINOPHIL # BLD AUTO: 0 10E9/L (ref 0–0.7)
EOSINOPHIL NFR BLD AUTO: 0.4 %
ERYTHROCYTE [DISTWIDTH] IN BLOOD BY AUTOMATED COUNT: 13.4 % (ref 10–15)
HCT VFR BLD AUTO: 29.7 % (ref 35–47)
HGB BLD-MCNC: 9.6 G/DL (ref 11.7–15.7)
IMM GRANULOCYTES # BLD: 0 10E9/L (ref 0–0.4)
IMM GRANULOCYTES NFR BLD: 0.3 %
LYMPHOCYTES # BLD AUTO: 1.4 10E9/L (ref 0.8–5.3)
LYMPHOCYTES NFR BLD AUTO: 20 %
MCH RBC QN AUTO: 30 PG (ref 26.5–33)
MCHC RBC AUTO-ENTMCNC: 32.3 G/DL (ref 31.5–36.5)
MCV RBC AUTO: 93 FL (ref 78–100)
MONOCYTES # BLD AUTO: 0.5 10E9/L (ref 0–1.3)
MONOCYTES NFR BLD AUTO: 7.1 %
NEUTROPHILS # BLD AUTO: 5 10E9/L (ref 1.6–8.3)
NEUTROPHILS NFR BLD AUTO: 71.9 %
NRBC # BLD AUTO: 0 10*3/UL
NRBC BLD AUTO-RTO: 0 /100
OPIATES UR QL SCN: NEGATIVE
PCP UR QL SCN: NEGATIVE
PLATELET # BLD AUTO: 211 10E9/L (ref 150–450)
RBC # BLD AUTO: 3.2 10E12/L (ref 3.8–5.2)
WBC # BLD AUTO: 6.9 10E9/L (ref 4–11)

## 2021-03-07 PROCEDURE — 258N000003 HC RX IP 258 OP 636: Performed by: FAMILY MEDICINE

## 2021-03-07 PROCEDURE — 999N000011 HC STATISTIC ANESTHESIA CASE

## 2021-03-07 PROCEDURE — 85025 COMPLETE CBC W/AUTO DIFF WBC: CPT | Performed by: FAMILY MEDICINE

## 2021-03-07 PROCEDURE — 86780 TREPONEMA PALLIDUM: CPT | Performed by: FAMILY MEDICINE

## 2021-03-07 PROCEDURE — 370N000003 HC ANESTHESIA WARD SERVICE

## 2021-03-07 PROCEDURE — 120N000001 HC R&B MED SURG/OB

## 2021-03-07 PROCEDURE — 86900 BLOOD TYPING SEROLOGIC ABO: CPT | Performed by: FAMILY MEDICINE

## 2021-03-07 PROCEDURE — 86901 BLOOD TYPING SEROLOGIC RH(D): CPT | Performed by: FAMILY MEDICINE

## 2021-03-07 PROCEDURE — 86850 RBC ANTIBODY SCREEN: CPT | Performed by: FAMILY MEDICINE

## 2021-03-07 PROCEDURE — 80307 DRUG TEST PRSMV CHEM ANLYZR: CPT | Performed by: FAMILY MEDICINE

## 2021-03-07 RX ORDER — ONDANSETRON 2 MG/ML
4 INJECTION INTRAMUSCULAR; INTRAVENOUS EVERY 6 HOURS PRN
Status: DISCONTINUED | OUTPATIENT
Start: 2021-03-07 | End: 2021-03-08

## 2021-03-07 RX ORDER — METHYLERGONOVINE MALEATE 0.2 MG/ML
200 INJECTION INTRAVENOUS
Status: DISCONTINUED | OUTPATIENT
Start: 2021-03-07 | End: 2021-03-08

## 2021-03-07 RX ORDER — SCOLOPAMINE TRANSDERMAL SYSTEM 1 MG/1
1 PATCH, EXTENDED RELEASE TRANSDERMAL
Status: DISCONTINUED | OUTPATIENT
Start: 2021-03-07 | End: 2021-03-08

## 2021-03-07 RX ORDER — IBUPROFEN 800 MG/1
800 TABLET, FILM COATED ORAL
Status: DISCONTINUED | OUTPATIENT
Start: 2021-03-07 | End: 2021-03-08

## 2021-03-07 RX ORDER — OXYCODONE AND ACETAMINOPHEN 5; 325 MG/1; MG/1
1 TABLET ORAL
Status: DISCONTINUED | OUTPATIENT
Start: 2021-03-07 | End: 2021-03-08

## 2021-03-07 RX ORDER — NALOXONE HYDROCHLORIDE 0.4 MG/ML
0.2 INJECTION, SOLUTION INTRAMUSCULAR; INTRAVENOUS; SUBCUTANEOUS
Status: DISCONTINUED | OUTPATIENT
Start: 2021-03-07 | End: 2021-03-08

## 2021-03-07 RX ORDER — FENTANYL CITRATE-0.9 % NACL/PF 10 MCG/ML
100 PLASTIC BAG, INJECTION (ML) INTRAVENOUS EVERY 5 MIN PRN
Status: DISCONTINUED | OUTPATIENT
Start: 2021-03-07 | End: 2021-03-08

## 2021-03-07 RX ORDER — OXYTOCIN/0.9 % SODIUM CHLORIDE 30/500 ML
100-340 PLASTIC BAG, INJECTION (ML) INTRAVENOUS CONTINUOUS PRN
Status: COMPLETED | OUTPATIENT
Start: 2021-03-07 | End: 2021-03-08

## 2021-03-07 RX ORDER — FENTANYL CITRATE 50 UG/ML
50-100 INJECTION, SOLUTION INTRAMUSCULAR; INTRAVENOUS
Status: DISCONTINUED | OUTPATIENT
Start: 2021-03-07 | End: 2021-03-08

## 2021-03-07 RX ORDER — TRANEXAMIC ACID 10 MG/ML
1 INJECTION, SOLUTION INTRAVENOUS EVERY 30 MIN PRN
Status: DISCONTINUED | OUTPATIENT
Start: 2021-03-07 | End: 2021-03-08

## 2021-03-07 RX ORDER — NALOXONE HYDROCHLORIDE 0.4 MG/ML
0.4 INJECTION, SOLUTION INTRAMUSCULAR; INTRAVENOUS; SUBCUTANEOUS
Status: DISCONTINUED | OUTPATIENT
Start: 2021-03-07 | End: 2021-03-08

## 2021-03-07 RX ORDER — SODIUM CHLORIDE, SODIUM LACTATE, POTASSIUM CHLORIDE, CALCIUM CHLORIDE 600; 310; 30; 20 MG/100ML; MG/100ML; MG/100ML; MG/100ML
INJECTION, SOLUTION INTRAVENOUS CONTINUOUS
Status: DISCONTINUED | OUTPATIENT
Start: 2021-03-07 | End: 2021-03-08

## 2021-03-07 RX ORDER — CARBOPROST TROMETHAMINE 250 UG/ML
250 INJECTION, SOLUTION INTRAMUSCULAR
Status: DISCONTINUED | OUTPATIENT
Start: 2021-03-07 | End: 2021-03-08

## 2021-03-07 RX ORDER — NALBUPHINE HYDROCHLORIDE 10 MG/ML
2.5-5 INJECTION, SOLUTION INTRAMUSCULAR; INTRAVENOUS; SUBCUTANEOUS EVERY 6 HOURS PRN
Status: DISCONTINUED | OUTPATIENT
Start: 2021-03-07 | End: 2021-03-08

## 2021-03-07 RX ORDER — OXYTOCIN 10 [USP'U]/ML
10 INJECTION, SOLUTION INTRAMUSCULAR; INTRAVENOUS
Status: DISCONTINUED | OUTPATIENT
Start: 2021-03-07 | End: 2021-03-08

## 2021-03-07 RX ORDER — ACETAMINOPHEN 325 MG/1
650 TABLET ORAL EVERY 4 HOURS PRN
Status: DISCONTINUED | OUTPATIENT
Start: 2021-03-07 | End: 2021-03-08

## 2021-03-07 RX ADMIN — SODIUM CHLORIDE, POTASSIUM CHLORIDE, SODIUM LACTATE AND CALCIUM CHLORIDE 500 ML: 600; 310; 30; 20 INJECTION, SOLUTION INTRAVENOUS at 23:02

## 2021-03-08 ENCOUNTER — MEDICAL CORRESPONDENCE (OUTPATIENT)
Dept: HEALTH INFORMATION MANAGEMENT | Facility: CLINIC | Age: 31
End: 2021-03-08

## 2021-03-08 LAB
ABO + RH BLD: NORMAL
ABO + RH BLD: NORMAL
BLD GP AB SCN SERPL QL: NORMAL
BLOOD BANK CMNT PATIENT-IMP: NORMAL
SPECIMEN EXP DATE BLD: NORMAL
T PALLIDUM AB SER QL: NONREACTIVE

## 2021-03-08 PROCEDURE — 258N000003 HC RX IP 258 OP 636: Performed by: FAMILY MEDICINE

## 2021-03-08 PROCEDURE — 250N000009 HC RX 250: Performed by: FAMILY MEDICINE

## 2021-03-08 PROCEDURE — 120N000001 HC R&B MED SURG/OB

## 2021-03-08 PROCEDURE — 00HU33Z INSERTION OF INFUSION DEVICE INTO SPINAL CANAL, PERCUTANEOUS APPROACH: ICD-10-PCS | Performed by: ANESTHESIOLOGY

## 2021-03-08 PROCEDURE — 59410 OBSTETRICAL CARE: CPT | Performed by: FAMILY MEDICINE

## 2021-03-08 PROCEDURE — 11981 INSERTION DRUG DLVR IMPLANT: CPT | Performed by: OBSTETRICS & GYNECOLOGY

## 2021-03-08 PROCEDURE — 250N000013 HC RX MED GY IP 250 OP 250 PS 637: Performed by: FAMILY MEDICINE

## 2021-03-08 PROCEDURE — 250N000011 HC RX IP 250 OP 636: Performed by: ANESTHESIOLOGY

## 2021-03-08 PROCEDURE — 86900 BLOOD TYPING SEROLOGIC ABO: CPT | Performed by: FAMILY MEDICINE

## 2021-03-08 PROCEDURE — 3E0R3BZ INTRODUCTION OF ANESTHETIC AGENT INTO SPINAL CANAL, PERCUTANEOUS APPROACH: ICD-10-PCS | Performed by: ANESTHESIOLOGY

## 2021-03-08 PROCEDURE — 722N000001 HC LABOR CARE VAGINAL DELIVERY SINGLE

## 2021-03-08 PROCEDURE — 0JHH3HZ INSERTION OF CONTRACEPTIVE DEVICE INTO LEFT LOWER ARM SUBCUTANEOUS TISSUE AND FASCIA, PERCUTANEOUS APPROACH: ICD-10-PCS | Performed by: OBSTETRICS & GYNECOLOGY

## 2021-03-08 RX ORDER — BISACODYL 10 MG
10 SUPPOSITORY, RECTAL RECTAL DAILY PRN
Status: DISCONTINUED | OUTPATIENT
Start: 2021-03-10 | End: 2021-03-09 | Stop reason: HOSPADM

## 2021-03-08 RX ORDER — AMOXICILLIN 250 MG
1 CAPSULE ORAL 2 TIMES DAILY
Status: DISCONTINUED | OUTPATIENT
Start: 2021-03-08 | End: 2021-03-09 | Stop reason: HOSPADM

## 2021-03-08 RX ORDER — MODIFIED LANOLIN
OINTMENT (GRAM) TOPICAL
Status: DISCONTINUED | OUTPATIENT
Start: 2021-03-08 | End: 2021-03-09 | Stop reason: HOSPADM

## 2021-03-08 RX ORDER — TRANEXAMIC ACID 10 MG/ML
1 INJECTION, SOLUTION INTRAVENOUS EVERY 30 MIN PRN
Status: DISCONTINUED | OUTPATIENT
Start: 2021-03-08 | End: 2021-03-09 | Stop reason: HOSPADM

## 2021-03-08 RX ORDER — OXYTOCIN/0.9 % SODIUM CHLORIDE 30/500 ML
100 PLASTIC BAG, INJECTION (ML) INTRAVENOUS CONTINUOUS
Status: DISCONTINUED | OUTPATIENT
Start: 2021-03-08 | End: 2021-03-09 | Stop reason: HOSPADM

## 2021-03-08 RX ORDER — OXYTOCIN 10 [USP'U]/ML
10 INJECTION, SOLUTION INTRAMUSCULAR; INTRAVENOUS
Status: DISCONTINUED | OUTPATIENT
Start: 2021-03-08 | End: 2021-03-09 | Stop reason: HOSPADM

## 2021-03-08 RX ORDER — HYDROCORTISONE 2.5 %
CREAM (GRAM) TOPICAL 3 TIMES DAILY PRN
Status: DISCONTINUED | OUTPATIENT
Start: 2021-03-08 | End: 2021-03-09 | Stop reason: HOSPADM

## 2021-03-08 RX ORDER — OXYTOCIN/0.9 % SODIUM CHLORIDE 30/500 ML
340 PLASTIC BAG, INJECTION (ML) INTRAVENOUS CONTINUOUS PRN
Status: DISCONTINUED | OUTPATIENT
Start: 2021-03-08 | End: 2021-03-09 | Stop reason: HOSPADM

## 2021-03-08 RX ORDER — IBUPROFEN 800 MG/1
800 TABLET, FILM COATED ORAL EVERY 6 HOURS PRN
Status: DISCONTINUED | OUTPATIENT
Start: 2021-03-08 | End: 2021-03-09 | Stop reason: HOSPADM

## 2021-03-08 RX ORDER — MEDROXYPROGESTERONE ACETATE 150 MG/ML
150 INJECTION, SUSPENSION INTRAMUSCULAR
Status: DISCONTINUED | OUTPATIENT
Start: 2021-03-08 | End: 2021-03-08

## 2021-03-08 RX ORDER — AMOXICILLIN 250 MG
2 CAPSULE ORAL 2 TIMES DAILY
Status: DISCONTINUED | OUTPATIENT
Start: 2021-03-08 | End: 2021-03-09 | Stop reason: HOSPADM

## 2021-03-08 RX ORDER — ACETAMINOPHEN 325 MG/1
650 TABLET ORAL EVERY 4 HOURS PRN
Status: DISCONTINUED | OUTPATIENT
Start: 2021-03-08 | End: 2021-03-09 | Stop reason: HOSPADM

## 2021-03-08 RX ORDER — MISOPROSTOL 200 UG/1
400 TABLET ORAL
Status: DISCONTINUED | OUTPATIENT
Start: 2021-03-08 | End: 2021-03-09 | Stop reason: HOSPADM

## 2021-03-08 RX ORDER — MEDROXYPROGESTERONE ACETATE 150 MG/ML
150 INJECTION, SUSPENSION INTRAMUSCULAR
Status: DISCONTINUED | OUTPATIENT
Start: 2021-03-08 | End: 2021-03-09 | Stop reason: HOSPADM

## 2021-03-08 RX ADMIN — DOCUSATE SODIUM 50 MG AND SENNOSIDES 8.6 MG 1 TABLET: 8.6; 5 TABLET, FILM COATED ORAL at 08:15

## 2021-03-08 RX ADMIN — DOCUSATE SODIUM 50 MG AND SENNOSIDES 8.6 MG 1 TABLET: 8.6; 5 TABLET, FILM COATED ORAL at 20:42

## 2021-03-08 RX ADMIN — ACETAMINOPHEN 650 MG: 325 TABLET, FILM COATED ORAL at 10:01

## 2021-03-08 RX ADMIN — Medication 340 ML/HR: at 01:30

## 2021-03-08 RX ADMIN — ACETAMINOPHEN 650 MG: 325 TABLET, FILM COATED ORAL at 14:21

## 2021-03-08 RX ADMIN — IBUPROFEN 800 MG: 800 TABLET ORAL at 14:20

## 2021-03-08 RX ADMIN — Medication 5 ML/HR: at 00:32

## 2021-03-08 RX ADMIN — ACETAMINOPHEN 650 MG: 325 TABLET, FILM COATED ORAL at 20:42

## 2021-03-08 RX ADMIN — IBUPROFEN 800 MG: 800 TABLET ORAL at 08:15

## 2021-03-08 RX ADMIN — SODIUM CHLORIDE, POTASSIUM CHLORIDE, SODIUM LACTATE AND CALCIUM CHLORIDE: 600; 310; 30; 20 INJECTION, SOLUTION INTRAVENOUS at 00:33

## 2021-03-08 RX ADMIN — IBUPROFEN 800 MG: 800 TABLET ORAL at 20:42

## 2021-03-08 NOTE — PLAN OF CARE
Data: Vital signs within normal limits. Postpartum checks within normal limits - see flow record. Patient eating and drinking normally. Patient able to empty bladder independently and is up ambulating. No apparent signs of infection. Incision healing well. Patient performing self cares and is able to care for infant.   Her support person was with her until 1300 assisting with cares.  Edel stated that she does not want the FOB visit.  [place] 10.  Patient not taking Zofoft.   Action: Patient medicated during the shift for cramping. See MAR. Patient reassessed within 1 hour after each medication and pain was improved - patient stated she was comfortable. Patient education done about infant and self cares including postpartum depression.  Patient stated that she has support. See flow record.  Response: Positive attachment behaviors observed with infant. Plan: Anticipate discharge on 3/9/21.  Bedside handoff given to Chhaya PITTS at 1530 and cares turned over.

## 2021-03-08 NOTE — PLAN OF CARE
Data: Lavern Rodriguez transferred to room 442 via wheelchair at 0350. Baby transferred via parent's arms.  Action: Receiving unit notified of transfer: Yes. Patient and family notified of room change. Report given to Megan PITTS at 0355. Belongings sent to receiving unit. Accompanied by Registered Nurse. Oriented patient to surroundings. Call light within reach. ID bands double-checked with receiving RN.  Response: Patient tolerated transfer and is stable.  Patients mobililty level scored using the bedside mobility assistance tool (BMAT). Patient is at a mobility level test number: 2. Mobility equipment used: dustin steady to toilet then able to stand from toilet to wheelchair without difficulty. Required assist of 1 staff members. Further use of BMAT scoring required.  Prior to transfer when patient was up to toilet cervix protruded outwards. MD was aware of this after delivery.

## 2021-03-08 NOTE — CONSULTS
[facility]  MATERNAL CHILD HEALTH   INITIAL PSYCHOSOCIAL ASSESSMENT     DATA:     Reason for Social Work Consult: History of drug use.    Presenting Information: SW met with patient in her room. She was on the phone when I arrived but got off quickly to engage with me.     Living Situation: Lives with her 2 other children, 2 and 1 year old. Currently they are with cousin who use to be their foster mother.    Social Support: She reports that she has good family support from her family and FOB family. They all live close and hey help often. She also reports that she gets great support for her CPS worker, Kylah.    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 She is currently on WIC but she stated that she needs to call them tomorrow to report the birht of this baby.    Mental Health History: Reports that she often struggles with anxiety but recently it has been much better.    History of Postpartum Mood Disorders: No history of postpartum depression with other 3 children. Information given and discussed signs and symptoms.    Chemical Health History: Patient reports that she has a long history of drug abuse. She reports that last March she gave birth to a daughter who was taken into custody due to testing positive to cocaine at the time of birth. Since that time she has gone to treatment and passed all of her random UAs. She has been sober since this past summer. She reports that prior to this sobriety, she had been sober for 6 years when she relapse last spring with her one year old child's pregnancy.She is very proud of her new sobriety. She reports that she has done everything that CPS wanted her to do and she has her children back. She reported that her CPS worker has been informed of this baby's birth.    Community Resources/PHN/WIC Patient is able to access resources. She is  currently on the WIC program but stated that she needs to call [facility] and report this new baby.    Assessment of Support System stable and involved.    Family interactions her best friend is her support person. She doesn't want contact with FOB as he a trigger to her sobriety. Good support from other family members.    Identified Barriers None.     Level of engagement with SW very engaged with SW. She was talkative and made very good eye contact. She is very proud of her new sobriety.    Social Work summary of primary risk factors identified relapse but is currently receiving good support from family, friends and CPS worker.    //Baby Supplies: Needs a crib and some diapers. These will be provided upon discharge.    INTERVENTION:       JUAN CARLOS completed chart review and collaborated with the multidisciplinary team. yes    Psychosocial Assessment yes    Introduction to Maternal Child Health  role and scope of practice yes    Reviewed Hospital and Community Resources yes    Assessed Chemical Health History and Current Symptoms yes    Assessed Mental Health History and Current Symptoms yes    Identified stressors, barriers and family concerns yes    Provided support and active empathetic listening and validation. yes    Provided psychoeducation on  mood and anxiety disorders, assessed for any current symptoms or historyyes    Provided brochure Depression and Anxiety During and after Pregnancy. yes    ASSESSMENT:     Coping:  Appears to be coping well she is very engaged with baby.    Affect: Appropriate affect, smiled appropriate as she was bright and cheerful.    Mood:  Appeared adequate, good eye contact as she asked appropriate questions and answered questions appropriately.    Motivation/Ability to Access Services: Highly motivated to be a good mother and stay sober. She is  independent in accessing services, and is able to ask for help.    Level of engagement with SW: Engaged and  appropriate. Able to seek out SW when needs arise.     Family and parent/infant interactions: Patient very attentiveness to baby    Assessment of parental risk for PMAD: Patient reports no issues with postpartum depression with other child. We discussed signs and symptoms and pamphlet given for postpartum depression.    Strengths: Patient is willingness to accept help, she is very proud of her sobriety and she wants to be a good mother to her children. She really wants to be sober for her children and herself.    Vulnerabilities: History of drug abuse and in the past she has struggled with sobriety.    Identified Barriers: needs a crib, diapers and a ride home if her friend can't come and gether.      PLAN:     SW will continue to follow throughout pt's Maternal-Child Health Journey as needs arise. SW will continue to collaborate with the multidisciplinary team.    Fartun Davidson, [facility] JUAN CARLOS   Inpatient Care Coordination   Supervisor  [facility]  [phone]

## 2021-03-08 NOTE — PROVIDER NOTIFICATION
03/08/21 0110   Provider Notification   Provider Name/Title Dr. Coleman   Method of Notification Phone   Request Attend Delivery   At 0110 MD was called to attend delivery

## 2021-03-08 NOTE — TELEPHONE ENCOUNTER
Triage Call:   Patient is calling stated around 7-7:30pm she has been having abdominal pain that comes and goes. Contractions are becoming more frequent coming less than 10 minutes apart, patient had 2 while on the phone. Patient stated it makes her feel like she has to have a bowl movement. Patient denied discharge or spotting. Pain is about 6-7/10. Patient plans to deliver at [facility]. Per protocol patient was advised to go to the L&[facility]. Patients friend is bringing her to the hospital now.     COVID 19 Nurse Triage Plan/Patient Instructions    Please be aware that novel coronavirus (COVID-19) may be circulating in the community. If you develop symptoms such as fever, cough, or SOB or if you have concerns about the presence of another infection including coronavirus (COVID-19), please contact your health care provider or visit [email or web address].     Disposition/Instructions    ED Visit recommended. Follow protocol based instructions.     Bring Your Own Device:  Please also bring your smart device(s) (smart phones, tablets, laptops) and their charging cables for your personal use and to communicate with your care team during your visit.    Thank you for taking steps to prevent the spread of this virus.  o Limit your contact with others.  o Wear a simple mask to cover your cough.  o Wash your hands well and often.    Resources    [facility]: About COVID-19: [email or web address]    CDC: What to Do If You're Sick: www.cdc.gov/coronavirus/2019-ncov/about/steps-when-sick.html    CDC: Ending Home Isolation: www.cdc.gov/coronavirus/2019-ncov/hcp/disposition-in-home-patients.html     CDC: Caring for Someone: www.cdc.gov/coronavirus/2019-ncov/if-you-are-sick/care-for-someone.html     [facility]: Interim Guidance for Hospital Discharge to Home: www.health.[facility].[place]/diseases/coronavirus/hcp/hospdischarge.pdf    [facility] clinical trials (COVID-19 research  "studies): [email or web address].[facility]-clinical-trials     Below are the [email or web address] hotlines at the [place] [facility] ([facility]). Interpreters are available.   o For health questions: Call 402-053-5901 or 1-224.707.2595 (7 a.m. to 7 p.m.)  o For questions about schools and childcare: Call 107-873-0611 or 1-469.201.8394 (7 a.m. to 7 p.m.)     Candis Baptiste RN Nursing Advisor 3/7/2021 9:20 PM     Reason for Disposition    [1] History of prior delivery (multipara) AND [2] contractions < 10 minutes apart AND [3] present 1 hour    Additional Information    Negative: Passed out (i.e., lost consciousness, collapsed and was not responding)    Negative: Shock suspected (e.g., cold/pale/clammy skin, too weak to stand, low BP, rapid pulse)    Negative: Difficult to awaken or acting confused (e.g., disoriented, slurred speech)    Negative: [1] SEVERE abdominal pain (e.g., excruciating) AND [2] constant AND [3] present > 1 hour    Negative: Umbilical cord hanging out of the vagina (shiny, white, curled appearance, \"like telephone cord\")    Negative: Severe bleeding (e.g., continuous red blood from vagina, or large blood clots)    Negative: Uncontrollable urge to push (i.e., feels like baby is coming out now)    Negative: Can see baby    Negative: Sounds like a life-threatening emergency to the triager    Negative: Pregnant < 37 weeks (i.e., )    Negative: [1] Uncertain delivery date AND [2] possibly pregnant < 37 weeks (i.e., )    Negative: [1] First baby (primipara) AND [2] contractions < 6 minutes apart  AND [3] present 2 hours    Protocols used: PREGNANCY - LABOR-A-AH      "

## 2021-03-08 NOTE — PROVIDER NOTIFICATION
21 [id number]   Provider Notification   Provider Name/Title Dr. Coleman   Method of Notification Phone   Request Evaluate - Remote   Notification Reason Patient Arrived     MD notified of patient arrival.  here to rule out labor. States she started sima around 1900, denies LOF or vaginal bleeding, and felt baby moving. [place] showing contractions q3-5 minutes apart, palpating moderate. FHT minimal variability and no accels noted. SVE 3/75/0. History of substance abuse and alcohol use in 1st trimester and had been in treatment from July-September and states she has been clean since. Will send a tox screen. Verbal orders from MD for intrapartum orderset. Will update MD as needed.

## 2021-03-08 NOTE — PLAN OF CARE
Pt meeting expected outcomes. Bottle feeding infant. Pt has been resting since transfer to post partum. Friend at bedside is providing infant cares, plan for FOB to come today. Pt denies pain, has not taken any pain medication. Has voided x1, will call for help to the bathroom before getting up.

## 2021-03-08 NOTE — PLAN OF CARE
Data: [facility] 10:00 PM.  Reason for maternal/fetal assessment is uterine contractions. Patient reports feeling contractions begin around 1900 and getting stronger and closer together since then.  Patient is a .  Prenatal record reviewed. Pregnancy  has been complicated by substance abuse in early pregnancy, anemia and anxiety/depression.  Gestational Age 38w6d. VSS. Fetal movement present. Patient denies leaking of vaginal fluid/rupture of membranes, vaginal bleeding, abdominal pain, nausea, vomiting, headache, visual disturbances, epigastric or URQ pain, significant edema. Support person is present.   Action: Verbal consent for EFM. Triage assessment completed. Bill of rights reviewed.  Response: Patient verbalized agreement with plan. Will contact Dr Katie Coleman with update and further orders.

## 2021-03-08 NOTE — ANESTHESIA PROCEDURE NOTES
Pre-Procedure   Staff -   Anesthesiologist:  Michael Torrez DO  Performed By: anesthesiologist  Referred By: do silver  Location: OB  Pre-Anesthestic Checklist: patient identified, IV checked, risks and benefits discussed, informed consent, monitors and equipment checked, pre-op evaluation and at physician/surgeon's request  Timeout:  Correct Patient: Yes   Correct Procedure: Yes   Correct Site: Yes   Correct Position: Yes   Correct Laterality: N/A   Site Marked: N/A    Procedure DocumentationProcedure: epidural catheter  Comments:  .Diagnosis- Anticipated vaginal delivery    Continuous Labor Epidural, indication is for labor pain. Following an discussion of the expectations, benefits and risk (including but not limited to nerve damage, infection, bleeding, spinal headache, partial or failed block)--questions were encouraged and answered. The patient appears to understand, and consents to proceed.     The patient received a fluid bolus per orders    Time-out performed.     The patient was in the sitting position, a PVP prep times three was done in the lumbar area followed by placement of a sterile drape. The skin was then infiltrated with lidocaine. A 17ga Touhy needle was then advanced under a loss of resistance technique until the epidural space was identified. The epidural catheter was then advanced and secured. The catheter was then bolused in divided doses with Bupivicaine 0.25% 12 cc, while the patient/fetus was monitored. Infusion orders written and infusion of bupivicaine 0.125% + fentanyl 2mcg/cc 10cc per hour started. PCEA 5cc bolus ever 15 minutes, with a maximum of 20cc per hour.    I or my partner, was immediately available and frequently monitored at necessary intervals.      RENE Torrez

## 2021-03-08 NOTE — ANESTHESIA PREPROCEDURE EVALUATION
Anesthesia Pre-Procedure Evaluation    Patient: Lavern Rodriguez   MRN: [id number] : 1990        Preoperative Diagnosis: * No pre-op diagnosis entered *   Procedure : * No procedures listed *     Past Medical History:   Diagnosis Date     Alcohol abuse      Asthma      Drug abuse (H)       Past Surgical History:   Procedure Laterality Date     HC INSERTION INTRAUTERINE DEVICE       HC REMOVE INTRAUTERINE DEVICE        No Known Allergies   Social History     Tobacco Use     Smoking status: Former Smoker     Packs/day: 0.50     Years: 4.00     Pack years: 2.00     Types: Cigarettes     Smokeless tobacco: Never Used   Substance Use Topics     Alcohol use: Not Currently     Comment: Currently in outpatient treatment      Wt Readings from Last 1 Encounters:   21 76.2 kg (168 lb)        Anesthesia Evaluation            ROS/MED HX  ENT/Pulmonary:     (+) asthma     Neurologic:  - neg neurologic ROS     Cardiovascular:  - neg cardiovascular ROS     METS/Exercise Tolerance:     Hematologic:  - neg hematologic  ROS     Musculoskeletal:       GI/Hepatic:  - neg GI/hepatic ROS     Renal/Genitourinary:       Endo:       Psychiatric/Substance Use:     (+) alcohol abuse     Infectious Disease:       Malignancy:       Other:            Physical Exam    Airway        Mallampati: II    Neck ROM: full     Respiratory Devices and Support         Dental           Cardiovascular   cardiovascular exam normal          Pulmonary   pulmonary exam normal            Other findings: .Lab Test        21                       2300          1514                            WBC          6.9          5.6           --           HGB          9.6*         9.5*         12.2          MCV          93           94            --           PLT          211          214          354            No lab results found.    OUTSIDE LABS:  CBC:   Lab Results   Component Value Date    WBC 6.9 2021    WBC 5.6  12/29/2020    HGB 9.6 (L) 03/07/2021    HGB 9.5 (L) 12/29/2020    HCT 29.7 (L) 03/07/2021    HCT 28.8 (L) 12/29/2020     03/07/2021     12/29/2020     BMP:   Lab Results   Component Value Date     10/26/2012    POTASSIUM 4.7 10/26/2012    CHLORIDE 105 10/26/2012    CO2 26 10/26/2012    BUN 14 10/26/2012    CR 0.71 10/26/2012    GLC 84 10/26/2012     COAGS: No results found for: PTT, INR, FIBR  POC:   Lab Results   Component Value Date    HCG Positive 10/15/2017     HEPATIC: No results found for: ALBUMIN, PROTTOTAL, ALT, AST, GGT, ALKPHOS, BILITOTAL, BILIDIRECT, MARANDA  OTHER:   Lab Results   Component Value Date    STEVENSON 9.7 10/26/2012    TSH 0.67 11/30/2020    T4 0.96 10/08/2020       Anesthesia Plan    ASA Status:  2      Anesthesia Type: Epidural.              Consents    Anesthesia Plan(s) and associated risks, benefits, and realistic alternatives discussed. Questions answered and patient/representative(s) expressed understanding.     - Discussed with:  Patient         Postoperative Care            Comments:           neg OB ROS.       Michael Torrez DO

## 2021-03-09 ENCOUNTER — TELEPHONE (OUTPATIENT)
Dept: OBGYN | Facility: CLINIC | Age: 31
End: 2021-03-09

## 2021-03-09 VITALS
RESPIRATION RATE: 16 BRPM | DIASTOLIC BLOOD PRESSURE: 71 MMHG | TEMPERATURE: 98 F | SYSTOLIC BLOOD PRESSURE: 111 MMHG | HEART RATE: 80 BPM

## 2021-03-09 LAB — HGB BLD-MCNC: 8.7 G/DL (ref 11.7–15.7)

## 2021-03-09 PROCEDURE — 250N000013 HC RX MED GY IP 250 OP 250 PS 637: Performed by: FAMILY MEDICINE

## 2021-03-09 PROCEDURE — 36415 COLL VENOUS BLD VENIPUNCTURE: CPT | Performed by: FAMILY MEDICINE

## 2021-03-09 PROCEDURE — 85018 HEMOGLOBIN: CPT | Performed by: FAMILY MEDICINE

## 2021-03-09 RX ADMIN — IBUPROFEN 800 MG: 800 TABLET ORAL at 05:17

## 2021-03-09 RX ADMIN — ACETAMINOPHEN 650 MG: 325 TABLET, FILM COATED ORAL at 06:01

## 2021-03-09 RX ADMIN — ACETAMINOPHEN 650 MG: 325 TABLET, FILM COATED ORAL at 02:07

## 2021-03-09 RX ADMIN — DOCUSATE SODIUM 50 MG AND SENNOSIDES 8.6 MG 1 TABLET: 8.6; 5 TABLET, FILM COATED ORAL at 09:05

## 2021-03-09 NOTE — PLAN OF CARE
Pt doing well and ready to discharge home today. Caring for baby independently. Using Ibu and Tylenol as needed. Discharge instructions reviewed and all questions answered. Pt called for cab through her insurance. Assisted pt and baby to wait in front lobby at 1215.

## 2021-03-09 NOTE — LETTER
March 9, 2021      Lavern Rodriguez  [address] [address]        To Whom It May Concern,      Ms. Lavern Rodriguez is under our care for her pregnancy. She had a vaginal delivery on 3/8/21 and should be excused from school through 4/19/2021.     If you have any questions, please let us know.          Sincerely,        Michelle Wetzel MD

## 2021-03-09 NOTE — DISCHARGE SUMMARY
DISCHARGE SUMMARY    Lavern Rodriguez  1990      Admission date:  3/7/2021  Discharge date: 2021    Admission diagnosis:   - Pregnancy at 39w0d  - Labor    Discharge diagnosis:   - Status post vaginal delivery  - Postpartum iron def anemia    Reason for Admission:   This is a 30 year old  39w0d who presented to Labor and Delivery and was admitted for labor.  The patient's pregnancy had been  C/b a history of cocaine use, but she is sober since 2020. She is on sertraline for depression/anxiety. Otherwise normal course.    Hospital Course:   The patient was admitted and underwent Vaginal delivery. Baby's hospital course was uncomplicated.  The patient's postpartum course was uncomplicated. The patient is bottle feeding.  She is meeting discharge criteria and desires to be discharged home today, PPD# 1        .    Discharge Exam:    Vitals:    21 1500 21 2042 21 2354 21 0900   BP: 108/66  109/73 111/71   Pulse: 79  81 80   Resp: 18 18 17 16   Temp: 98.6  F (37  C)  97.5  F (36.4  C) 98  F (36.7  C)   TempSrc: Oral  Oral Oral       Constitutional: healthy, alert and no distress    Abdomen:  Uterine fundus is firm, non-tender and at the level of the umbilicus     LE:  no edema, non-tender      LABS:  Hemoglobin   Date Value Ref Range Status   2021 8.7 (L) 11.7 - 15.7 g/dL Final   2021 9.6 (L) 11.7 - 15.7 g/dL Final     No results found for: RUBELLAABIGG   Lab Results   Component Value Date    ABO O 2021           Lab Results   Component Value Date    RH Neg 2021                Discharge Medications:       Review of your medicines      CONTINUE these medicines which have NOT CHANGED      Dose / Directions   prenatal multivitamin w/iron 27-0.8 MG tablet      Dose: 1 tablet  Take 1 tablet by mouth daily  Refills: 0     sertraline 25 MG tablet  Commonly known as: ZOLOFT      Dose: 50 mg  Take 50 mg by mouth daily  Refills: 0            Patient  Instructions:  Activity: the patient was instructed to have pelvic rest for 6 weeks.  Nothing in the vagina. No tampons, douching, or intercourse.  No driving while on narcotics.  If delivered by , no heavy lifting, pushing, or pulling greater than 15 lbs for 6 weeks. She should notify her physician with temperature greater than 100.4 degrees, and if she is having any brisk vaginal bleeding where she soaks through greater than 1 pad per hour for more than 2 hours, if any area on her breast becomes red or painful, or if her pain is no longer controlled by pain medications.  Diet as tolerated.  Discussed symptoms of post-partum blues and depression and urged her to contact us immediately should that become a concern.    If any history of hypertension in the hospital, please either check your blood pressure at home, or make an appointment for a follow up nurse blood pressure check in clinic within 5-7 days of discharge. If any severe headache, upper abdominal pain, or significant visual changes with headache, please call the clinic.    Follow-up with primary OB in 6 weeks for a postpartum visit.    Liat New MD  2021

## 2021-03-09 NOTE — PROGRESS NOTES
D) SW following family for discharge planning.  I) SW brought pack-n-play and diapers to MOB. She reports she wanted something smaller and light weight like a bassinet, she declined pack-n-play.  SW discussed other resources available for this.  A) MOB appropriately holding/bonding with baby.  P) Pt is prepared to discharge to home today. She plans to contact her [facility] cab for transport home.  No further discharge needs.  Jeison MOSS Case Management  Inpatient   Maternal Child and ED  [facility]    [phone]

## 2021-03-09 NOTE — PLAN OF CARE
VSS.  Pain managed with ibuprofen and Tylenol.  Patient is ambulating and voiding without difficulty.  Patient is bonding well with infant and independent with bottle feeding and infant cares.  Desires discharge today, if possible.

## 2021-03-09 NOTE — PLAN OF CARE
Pt. vitals stable. Pain managed with PRN ibuprofen and acetaminophen. Independent in infant and personal cares. Formula feeding infant. Attentive to infant needs and bonding well with infant.

## 2021-03-09 NOTE — ANESTHESIA POSTPROCEDURE EVALUATION
Patient: Lavern Rodriguez    * No procedures listed *    Diagnosis:* No pre-op diagnosis entered *  Diagnosis Additional Information: No value filed.    Anesthesia Type:  Epidural    Note:  Disposition: Inpatient   Postop Pain Control: Uneventful            Sign Out: Well controlled pain   PONV: No   Neuro/Psych: Uneventful            Sign Out: Acceptable/Baseline neuro status   Airway/Respiratory: Uneventful            Sign Out: Acceptable/Baseline resp. status   CV/Hemodynamics: Uneventful            Sign Out: Acceptable CV status   Other NRE: NONE   DID A NON-ROUTINE EVENT OCCUR? No         Last vitals:  Vitals:    03/08/21 1500 03/08/21 2042 03/08/21 2354   BP: 108/66  109/73   Pulse: 79  81   Resp: 18 18 17   Temp: 98.6  F (37  C)  97.5  F (36.4  C)       Last vitals prior to Anesthesia Care Transfer:      Electronically Signed By: Medardo Finn MD  March 9, 2021  6:44 AM

## 2021-03-09 NOTE — PROCEDURES
Nexplanon insertion procedure note  3/8/2021     INDICATIONS:                                                      Patient presents for placement of Nexplanon for contraception.  She has had been counseled on side effects, risks, benefits and alternatives - including risk of abnormal uterine bleeding.  Patient desires to proceed.    Is a pregnancy test required: No.  Was a consent obtained?  Yes    Consent:  Risks, benefits of treatment, and alternative options for contraception were discussed.  Discussed retaining the device for 3-6 months even if an abnormal bleeding pattern occurs and attempting to treat through.  She agrees.  Patient's questions were elicited and answered.  Written consent was obtained and scanned into medical record.     PROCEDURE:                                                      Patient was resting comfortably on exam table, left arm placed at shoulder level in a 90 degree angle.  Skin was marked at old nexplanon scar site and cleansed with betadine solution.  Insertion site and track infiltrated with 5 cc 1% Lidocaine.      Nexplanon device visualized in applicator by patient and provider.  Skin punctured with applicator at insertion site and advanced with ease in the subdermal space.  Applicator was removed.  Nexplanon was palpated by provider and patient.    Small amount of bleeding noted at insertion site and no bruising noted along track of Nexplanon.  Bandage and pressure dressing applied to insertion site.         POST PROCEDURE:                                                      To be removed/replaced within three years. Written and verbal instructions provided to patient.  She tolerated the procedure well. There were no complications. Patient was discharged in stable condition.    Keep dressing on and dry for 24 hours, then remove wrap.  Replace bandaid daily for 5 days. Keep your user card in a safe place where you'll remember it.  Make note of today's date for  removal/replacement of your Nexplanon in 3 years. Call us if there is any redness, tenderness, warmth or drainage from the area of your Nexplanon insertion. Use a backup method of birth control for 7 days.      Michelle Wetzel MD

## 2021-03-09 NOTE — TELEPHONE ENCOUNTER
Pt calling.   Had a vaginal delivery yesterday.   She is currently a student and needs a letter school.    Routed to md on-call to make sure it is okay to write, with 6 weeks off.   Pt will print the letter from my chart.    Malia VERGARA RN

## 2021-03-10 NOTE — L&D DELIVERY NOTE
OB Vaginal Delivery Note    Lavern Rodriguez MRN# [id number]   Age: 30 year old YOB: 1990       GA: 39w0d  GP:   Labor Complications: None   EBL:   mL  Delivery QBL: 336 mL  Delivery Type: Vaginal, Spontaneous   ROM to Delivery Time: (Delivered) Minutes: 10   Weight: 2.79 kg (6 lb 2.4 oz)    1 Minute 5 Minute 10 Minute   Apgar Totals: 8   9        SADIQ DOMINGUEZ;THOMPSON PATTEN     Delivery Details:  Lavern Rodriguez, a 30 year old  female delivered a viable infant with apgars of 8  and 9 . Patient was fully dilated and pushing after   hours   minutes in active labor. Delivery was via vaginal, spontaneous  to a sterile field under epidural  anesthesia. Infant delivered in vertex  left  occiput  anterior  position. Anterior and posterior shoulders delivered without difficulty. The cord was clamped, cut twice and 3 vessels  were noted. Cord blood was obtained in routine fashion with the following disposition: lab .      Cord complications: none   Placenta delivered at 3/8/2021  1:31 AM . Placental disposition was Pathology . Fundal massage performed and fundus found to be firm.     Episiotomy: none    Perineum, vagina, cervix were inspected, and the following lacerations were noted:   Perineal lacerations: none                  Excellent hemostasis was noted. Needle count correct. Infant and patient in delivery room in good and stable condition.        Dominic Rodriguez-Lavern [[id number]]    Labor Event Times    Dilation complete date: 3/8/21 Complete time:  1:10 AM   Start pushing date/time: 3/8/2021 0126      Labor Length    2nd Stage (hrs): 0 (min): 17   3rd Stage (hrs): 0 (min): 4      Labor Events     labor?: No   steroids: None  Labor Type: Spontaneous     Antibiotics received during labor?: No     Rupture date/time:     Rupture type: Spontaneous rupture of membranes occuring during spontaneous labor or augmentation     Delivery/Placenta Date and Time   "  Delivery Date: 3/8/21 Delivery Time:  1:27 AM   Placenta Date/Time: 3/8/2021  1:31 AM  Oxytocin given at the time of delivery: after delivery of baby     Vaginal Counts     Initial count performed by 2 team members:  Two Team Members   Dr. Jared SCHUSTER RN       Needles Suture [place] Sponges Instruments   Initial counts 2  5    Added to count       Final counts 2  5    Placed during labor Accounted for at the end of labor   No NA   No NA   No NA    Final count performed by 2 team members:  Two Team Members   Dalia Coleman MD         Apgars    Living status: Living   1 Minute 5 Minute 10 Minute 15 Minute 20 Minute   Skin color: 0  1       Heart rate: 2  2       Reflex irritability: 2  2       Muscle tone: 2  2       Respiratory effort: 2  2       Total: 8  9       Apgars assigned by: DALIA OBREGON RN     Cord    Vessels: 3 Vessels Complications: None   Cord Blood Disposition: Lab Gases Sent?: No       Resuscitation    Methods: None     Ronda Measurements    Weight: 6 lb 2.4 oz Length: 1' 8\"   Head circumference: 33 cm       Skin to Skin and Feeding Plan    Skin to skin initiation date/time: 1/3/1841    Skin to skin end date/time:     How do you plan to feed your baby: Formula     Labor Events and Shoulder Dystocia    Fetal Tracing Prior to Delivery: Category 1  Shoulder dystocia present?: Neg     Delivery (Maternal) (Provider to Complete) ([id number])    Episiotomy: None  Perineal lacerations: None       Blood Loss  Mother: Lavern Rodriguez S SHAHRIAR #[id number]   Start of Mother's Information    IO Blood Loss  21 1327 - 21 0127    Delivery QBL (mL) Hospital Encounter 336 mL    Total  336 mL         End of Mother's Information  Mother: Lavern Rodriguez SHAHRIAR #[id number]          Delivery - Provider to Complete ([id number])    Delivering clinician: Katie Coleman DO  Delivery Type (Choose the 1 that will go to the Birth History): Vaginal, Spontaneous                   Other " personnel:  Provider Role   Dalia Lira, RN Delivery Nurse   Sharita Araujo, RN Delivery Assist                Placenta    Delayed Cord Clamping: Done  Date/Time: 3/8/2021  1:31 AM  Removal: Spontaneous  Disposition: Pathology           Anesthesia    Method: Epidural  Cervical dilation at placement: 4-7     Analgesic:  BIRTH HISTORY: ANALGESIC   FENTANYL 2 MCG/ML - BUPIVACAINE 0.125% (0.5% SOURCE)  ML NS EPIDURAL (YELLOW CADD CASSETTE) CNR             Presentation and Position    Presentation: Vertex    Position: Left Occiput Anterior                 Katie Coleman DO

## 2021-03-10 NOTE — H&P
No significant change in general health status based on examination of the patient, review of Nursing Admission Database and prenatal record.    Dr. Katie Coleman,     Obstetrics and Gynecology  [facility] [place]

## 2021-03-18 ENCOUNTER — PATIENT OUTREACH (OUTPATIENT)
Dept: NURSING | Facility: CLINIC | Age: 31
End: 2021-03-18

## 2021-03-18 ASSESSMENT — ACTIVITIES OF DAILY LIVING (ADL): DEPENDENT_IADLS:: INDEPENDENT

## 2021-03-18 NOTE — PROGRESS NOTES
Clinic Care Coordination Contact    Follow Up Progress Note      Assessment: Call placed to Patient to complete post-hospital follow-up. Patient reports that she and baby are doing well.     Goals addressed this encounter:   Goals Addressed                 This Visit's Progress       Patient Stated      COMPLETED: 1. Psychosocial (pt-stated)        Goal Statement: I want to have  for my children coordinated when I deliver.  Date Goal set: 2.19.2021  Barriers: Navigating programs/availability of support, unclear delivery date.  Strengths: Recognition of need, CPS  involvement.   Date to Achieve By: 3.19.2021  Patient expressed understanding of goal: Pt reports understanding and denies any additional questions or concerns at this times. JUAN CARLOS CC engaged in AIDET communication during encounter.    Action steps to achieve this goal:  1. I will review resources sent by [facility]AN CARL[facility] CC.   2. I will continue to collaborate with CPS .   3. I will make a  plan and follow-through at delivery.              Intervention/Education provided during outreach: JUAN CARLOS PORTER inquired about additional needs/concerns. Pt reports that her main need will be  when she has appointments. She is aware of the crisis nursery and  assistance. JUAN CARLOS CC will include the information for both resources via a FNZ message. Pt declines need for further SW CC interventions at this time.     Outreach Frequency: monthly    Plan:   JUAN CARLOS CC encouraged Pt to contact SW CC with additional CC questions/concerns. Pt is transitioned to maintenance and JUAN CARLOS CC will outreach to Patient in 2 months to discuss graduation.     ANDREEA Amato  Clinic Care Coordinator  Ph. [phone]  [email or web address]

## 2021-03-18 NOTE — LETTER
[facility] CARE COORDINATION  [address] [place]  [place], [address]  March 18, 2021    Lavern Rodriguez  [address] [facility] [address]      Dear Lavern,    Thank you for taking the time to speak with me over the phone today. As we discussed, I've included the information for the crisis nursery and the  assistance program:    -[facility] Crisis Nursery: Crisis Nursery is here to support families 24/7 during this stressful time. If you need someone to talk to, are looking for resources or are in need of care for your children please contact us at [phone].   [email or web address]    -[facility]  Assistance: Paying for  can be difficult as you look for a job, go to work or go to school to prepare for work.  [facility] administers the State  Assistance Programs (CCAP) according to Federal and State policy. There are three  assistance programs.  Phone: [phone]  [email or web address]    Please feel free to contact me at [phone] with any questions or concerns. We are focused on providing you with the highest-quality healthcare experience possible and that all starts with you.     Sincerely,         ANDREEA Amato  Clinic Care Coordinator  Ph. [phone]  [email or web address]

## 2021-05-28 ENCOUNTER — PATIENT OUTREACH (OUTPATIENT)
Dept: CARE COORDINATION | Facility: CLINIC | Age: 31
End: 2021-05-28

## 2021-05-28 ASSESSMENT — ACTIVITIES OF DAILY LIVING (ADL): DEPENDENT_IADLS:: INDEPENDENT

## 2021-05-28 NOTE — LETTER
[facility] CARE COORDINATION  [address] [place] [place]  [place], [address]  May 28, 2021    Lavern Rodriguez  [address] [facility] [address]    Dear Lavern,    Your Care Team congratulates you on your journey to maintain wellness. This document will help guide you on your journey to maintain a healthy lifestyle.  You can use this to help you overcome any barriers you may encounter.  If you should have any questions or concerns, you can contact the members of your Care Team or contact your Primary Care Clinic for assistance.     Health Maintenance  Health Maintenance Reviewed: Due/Overdue   Health Maintenance Due   Topic Date Due     ADVANCE CARE PLANNING  Never done     COVID-19 Vaccine (1) Never done     PREVENTIVE CARE VISIT  09/25/2013     PHQ-9  04/08/2021     PAP  08/05/2021       My Access Plan  Medical Emergency 911   Primary Clinic Line [facility] [phone]   24 Hour Appointment Line [phone] or  [phone]) (toll-free)   24 Hour Nurse Line [phone] (toll-free)   Preferred Urgent Care     Preferred Hospital [facility]  [phone]   Preferred Pharmacy [facility] [place] [facility] - [place], [place] - [address], [address]     Behavioral Health Crisis Line The National Suicide Prevention Lifeline at 1-711.954.7153 or 911     My Care Team Members  Patient Care Team       Relationship Specialty Notifications Start End    Michelle Wetzel MD Assigned OBGYN Provider   1/24/21     Phone: [phone] Fax: [phone] [address] [facility] [address]    Theresa Palomares [facility] Lead Care Coordinator   2/15/21               Goals        Patient Stated      COMPLETED: 1. Psychosocial (pt-stated)      Goal Statement: I want to have  for my children coordinated when I deliver.  Date Goal set: 2.19.2021  Barriers: Navigating programs/availability of support, unclear delivery date.  Strengths:  Recognition of need, CPS  involvement.   Date to Achieve By: 3.19.2021  Patient expressed understanding of goal: Pt reports understanding and denies any additional questions or concerns at this times. JUAN CARLOS PORTER engaged in AIDET communication during encounter.    Action steps to achieve this goal:  1. I will review resources sent by JUAN CARLOS PORTER.   2. I will continue to collaborate with CPS .   3. I will make a  plan and follow-through at delivery.              Advance Care Plans/Directives Type:      We notice that you do not have an Advance Directive on file. Upon completion of your Health Care Directive, please bring a copy with you to your next office visit.    It has been your Clinic Care Team's pleasure to work with you on your goals.    Regards,    Your Clinic Care Team    ANDREEA Amato  Clinic Care Coordinator  Ph. [phone]  [email or web address]

## 2021-05-28 NOTE — PROGRESS NOTES
Clinic Care Coordination Contact    Assessment: Care Coordinator contacted patient for 2 month follow up.    Outreach attempted x 1.  Unable to leave message as phone number has restricted use. SW CC will send a [employer] message with SW CC contact information, in case Pt would like to contact CC with new needs/concerns and update contact information.     Enrollment status: Graduated.      Plan: No further outreaches at this time.  Patient will continue to follow the plan of care.  If new needs arise a new Care Coordination referral may be placed.  FYI to PCP    ANDREEA Amato  Clinic Care Coordinator  Ph. [phone]  [email or web address]

## 2021-09-04 ENCOUNTER — HEALTH MAINTENANCE LETTER (OUTPATIENT)
Age: 31
End: 2021-09-04

## 2021-10-19 DIAGNOSIS — Z11.59 ENCOUNTER FOR SCREENING FOR OTHER VIRAL DISEASES: ICD-10-CM

## 2021-11-09 DIAGNOSIS — Z11.59 ENCOUNTER FOR SCREENING FOR OTHER VIRAL DISEASES: ICD-10-CM

## 2021-11-15 ENCOUNTER — LAB (OUTPATIENT)
Dept: LAB | Facility: CLINIC | Age: 31
End: 2021-11-15
Attending: COLON & RECTAL SURGERY

## 2021-11-15 DIAGNOSIS — Z11.59 ENCOUNTER FOR SCREENING FOR OTHER VIRAL DISEASES: ICD-10-CM

## 2021-11-15 LAB — SARS-COV-2 RNA RESP QL NAA+PROBE: NEGATIVE

## 2021-11-15 PROCEDURE — U0005 INFEC AGEN DETEC AMPLI PROBE: HCPCS | Performed by: COLON & RECTAL SURGERY

## 2021-11-18 ENCOUNTER — HOSPITAL ENCOUNTER (OUTPATIENT)
Facility: CLINIC | Age: 31
Discharge: HOME | End: 2021-11-18
Attending: COLON & RECTAL SURGERY | Admitting: COLON & RECTAL SURGERY
Payer: COMMERCIAL

## 2021-11-18 ENCOUNTER — HOSPITAL ENCOUNTER (OUTPATIENT)
Dept: GENERAL RADIOLOGY | Facility: CLINIC | Age: 31
Discharge: HOME | End: 2021-11-18
Attending: COLON & RECTAL SURGERY | Admitting: COLON & RECTAL SURGERY
Payer: COMMERCIAL

## 2021-11-18 VITALS
HEART RATE: 67 BPM | DIASTOLIC BLOOD PRESSURE: 82 MMHG | RESPIRATION RATE: 10 BRPM | OXYGEN SATURATION: 100 % | SYSTOLIC BLOOD PRESSURE: 113 MMHG

## 2021-11-18 DIAGNOSIS — Z93.3 COLOSTOMY STATUS (H): ICD-10-CM

## 2021-11-18 LAB — FLEXIBLE SIGMOIDOSCOPY: NORMAL

## 2021-11-18 PROCEDURE — 45330 DIAGNOSTIC SIGMOIDOSCOPY: CPT | Performed by: COLON & RECTAL SURGERY

## 2021-11-18 PROCEDURE — 74270 X-RAY XM COLON 1CNTRST STD: CPT

## 2021-11-18 RX ADMIN — DIATRIZOATE MEGLUMINE AND DIATRIZOATE SODIUM 480 ML: 660; 100 SOLUTION ORAL; RECTAL at 09:39

## 2021-11-27 ENCOUNTER — LAB (OUTPATIENT)
Dept: URGENT CARE | Facility: URGENT CARE | Age: 31
End: 2021-11-27
Attending: COLON & RECTAL SURGERY
Payer: COMMERCIAL

## 2021-11-27 DIAGNOSIS — Z11.59 ENCOUNTER FOR SCREENING FOR OTHER VIRAL DISEASES: ICD-10-CM

## 2021-11-27 PROCEDURE — U0005 INFEC AGEN DETEC AMPLI PROBE: HCPCS

## 2021-11-27 PROCEDURE — U0003 INFECTIOUS AGENT DETECTION BY NUCLEIC ACID (DNA OR RNA); SEVERE ACUTE RESPIRATORY SYNDROME CORONAVIRUS 2 (SARS-COV-2) (CORONAVIRUS DISEASE [COVID-19]), AMPLIFIED PROBE TECHNIQUE, MAKING USE OF HIGH THROUGHPUT TECHNOLOGIES AS DESCRIBED BY CMS-2020-01-R: HCPCS

## 2021-11-29 LAB — SARS-COV-2 RNA RESP QL NAA+PROBE: NEGATIVE

## 2021-11-29 RX ORDER — METRONIDAZOLE 500 MG/1
TABLET ORAL
Status: ON HOLD | COMMUNITY
Start: 2021-10-26 | End: 2021-12-01

## 2021-11-29 RX ORDER — DOCUSATE SODIUM 100 MG
CAPSULE ORAL
Status: ON HOLD | COMMUNITY
Start: 2021-09-22 | End: 2021-12-01

## 2021-11-29 RX ORDER — NEOMYCIN SULFATE 500 MG/1
TABLET ORAL
Status: ON HOLD | COMMUNITY
Start: 2021-10-26 | End: 2021-12-01

## 2021-11-29 RX ORDER — POLYETHYLENE GLYCOL 3350 17 G/17G
POWDER, FOR SOLUTION ORAL
Status: ON HOLD | COMMUNITY
Start: 2021-09-22 | End: 2021-12-01

## 2021-11-29 RX ORDER — SERTRALINE HYDROCHLORIDE 100 MG/1
100 TABLET, FILM COATED ORAL DAILY
COMMUNITY
End: 2023-01-23

## 2021-11-29 RX ORDER — MAGNESIUM CITRATE
SOLUTION, ORAL ORAL
Status: ON HOLD | COMMUNITY
Start: 2021-11-30 | End: 2021-12-01

## 2021-11-29 RX ORDER — QUETIAPINE FUMARATE 100 MG/1
100 TABLET, FILM COATED ORAL AT BEDTIME
COMMUNITY
End: 2023-01-23

## 2021-11-29 RX ORDER — BISACODYL 5 MG/1
TABLET, DELAYED RELEASE ORAL
Status: ON HOLD | COMMUNITY
Start: 2021-11-29 | End: 2021-12-01

## 2021-11-30 ENCOUNTER — ANESTHESIA EVENT (OUTPATIENT)
Dept: SURGERY | Facility: CLINIC | Age: 31
End: 2021-11-30
Payer: COMMERCIAL

## 2021-11-30 ENCOUNTER — ANESTHESIA (OUTPATIENT)
Dept: SURGERY | Facility: CLINIC | Age: 31
End: 2021-11-30
Payer: COMMERCIAL

## 2021-11-30 ENCOUNTER — HOSPITAL ENCOUNTER (INPATIENT)
Facility: CLINIC | Age: 31
LOS: 1 days | Discharge: HOME | End: 2021-12-01
Attending: COLON & RECTAL SURGERY | Admitting: COLON & RECTAL SURGERY
Payer: COMMERCIAL

## 2021-11-30 DIAGNOSIS — Z98.890 S/P COLOSTOMY TAKEDOWN: Primary | ICD-10-CM

## 2021-11-30 LAB
ABO/RH(D): NORMAL
ALBUMIN SERPL-MCNC: 3.8 G/DL (ref 3.4–5)
ALP SERPL-CCNC: 94 U/L (ref 40–150)
ALT SERPL W P-5'-P-CCNC: 21 U/L (ref 0–50)
AMMONIA PLAS-SCNC: 26 UMOL/L (ref 10–50)
ANION GAP SERPL CALCULATED.3IONS-SCNC: 8 MMOL/L (ref 3–14)
ANTIBODY SCREEN: NEGATIVE
APTT PPP: 35 SECONDS (ref 22–38)
AST SERPL W P-5'-P-CCNC: 19 U/L (ref 0–45)
BASE EXCESS BLDA CALC-SCNC: -3.6 MMOL/L (ref -9–1.8)
BILIRUB DIRECT SERPL-MCNC: 0.1 MG/DL (ref 0–0.2)
BILIRUB SERPL-MCNC: 0.5 MG/DL (ref 0.2–1.3)
BUN SERPL-MCNC: 13 MG/DL (ref 7–30)
CALCIUM SERPL-MCNC: 9.2 MG/DL (ref 8.5–10.1)
CHLORIDE BLD-SCNC: 107 MMOL/L (ref 94–109)
CO2 SERPL-SCNC: 23 MMOL/L (ref 20–32)
CREAT SERPL-MCNC: 0.68 MG/DL (ref 0.52–1.04)
ERYTHROCYTE [DISTWIDTH] IN BLOOD BY AUTOMATED COUNT: 13.5 % (ref 10–15)
GFR SERPL CREATININE-BSD FRML MDRD: >90 ML/MIN/1.73M2
GLUCOSE BLD-MCNC: 118 MG/DL (ref 70–99)
GLUCOSE BLDC GLUCOMTR-MCNC: 137 MG/DL (ref 70–99)
HCO3 BLD-SCNC: 22 MMOL/L (ref 21–28)
HCT VFR BLD AUTO: 41.1 % (ref 35–47)
HGB BLD-MCNC: 12.9 G/DL (ref 11.7–15.7)
LACTATE SERPL-SCNC: 1.6 MMOL/L (ref 0.7–2)
LACTATE SERPL-SCNC: 6.6 MMOL/L (ref 0.7–2)
MCH RBC QN AUTO: 28.7 PG (ref 26.5–33)
MCHC RBC AUTO-ENTMCNC: 31.4 G/DL (ref 31.5–36.5)
MCV RBC AUTO: 92 FL (ref 78–100)
O2/TOTAL GAS SETTING VFR VENT: 21 %
PCO2 BLD: 43 MM HG (ref 35–45)
PH BLD: 7.32 [PH] (ref 7.35–7.45)
PLATELET # BLD AUTO: 218 10E3/UL (ref 150–450)
PO2 BLD: 82 MM HG (ref 80–105)
POTASSIUM BLD-SCNC: 3.3 MMOL/L (ref 3.4–5.3)
PROT SERPL-MCNC: 7.8 G/DL (ref 6.8–8.8)
RBC # BLD AUTO: 4.49 10E6/UL (ref 3.8–5.2)
SODIUM SERPL-SCNC: 138 MMOL/L (ref 133–144)
SPECIMEN EXPIRATION DATE: NORMAL
WBC # BLD AUTO: 9.7 10E3/UL (ref 4–11)

## 2021-11-30 PROCEDURE — 250N000009 HC RX 250

## 2021-11-30 PROCEDURE — 80048 BASIC METABOLIC PNL TOTAL CA: CPT | Performed by: INTERNAL MEDICINE

## 2021-11-30 PROCEDURE — 0DQB0ZZ REPAIR ILEUM, OPEN APPROACH: ICD-10-PCS | Performed by: COLON & RECTAL SURGERY

## 2021-11-30 PROCEDURE — 250N000013 HC RX MED GY IP 250 OP 250 PS 637: Performed by: COLON & RECTAL SURGERY

## 2021-11-30 PROCEDURE — 120N000001 HC R&B MED SURG/OB

## 2021-11-30 PROCEDURE — 250N000009 HC RX 250: Performed by: COLON & RECTAL SURGERY

## 2021-11-30 PROCEDURE — 250N000011 HC RX IP 250 OP 636: Performed by: ANESTHESIOLOGY

## 2021-11-30 PROCEDURE — 83605 ASSAY OF LACTIC ACID: CPT | Performed by: INTERNAL MEDICINE

## 2021-11-30 PROCEDURE — 85730 THROMBOPLASTIN TIME PARTIAL: CPT | Performed by: ANESTHESIOLOGY

## 2021-11-30 PROCEDURE — 85027 COMPLETE CBC AUTOMATED: CPT | Performed by: INTERNAL MEDICINE

## 2021-11-30 PROCEDURE — 36415 COLL VENOUS BLD VENIPUNCTURE: CPT | Performed by: INTERNAL MEDICINE

## 2021-11-30 PROCEDURE — 36600 WITHDRAWAL OF ARTERIAL BLOOD: CPT

## 2021-11-30 PROCEDURE — 99207 PR CONSULT E&M CHANGED TO INITIAL LEVEL: CPT | Performed by: INTERNAL MEDICINE

## 2021-11-30 PROCEDURE — 36415 COLL VENOUS BLD VENIPUNCTURE: CPT | Performed by: ANESTHESIOLOGY

## 2021-11-30 PROCEDURE — 360N000077 HC SURGERY LEVEL 4, PER MIN: Performed by: COLON & RECTAL SURGERY

## 2021-11-30 PROCEDURE — 999N000141 HC STATISTIC PRE-PROCEDURE NURSING ASSESSMENT: Performed by: COLON & RECTAL SURGERY

## 2021-11-30 PROCEDURE — 82803 BLOOD GASES ANY COMBINATION: CPT | Performed by: INTERNAL MEDICINE

## 2021-11-30 PROCEDURE — 370N000017 HC ANESTHESIA TECHNICAL FEE, PER MIN: Performed by: COLON & RECTAL SURGERY

## 2021-11-30 PROCEDURE — 999N000157 HC STATISTIC RCP TIME EA 10 MIN

## 2021-11-30 PROCEDURE — 250N000011 HC RX IP 250 OP 636: Performed by: COLON & RECTAL SURGERY

## 2021-11-30 PROCEDURE — 99221 1ST HOSP IP/OBS SF/LOW 40: CPT | Performed by: INTERNAL MEDICINE

## 2021-11-30 PROCEDURE — 82140 ASSAY OF AMMONIA: CPT | Performed by: INTERNAL MEDICINE

## 2021-11-30 PROCEDURE — 250N000009 HC RX 250: Performed by: ANESTHESIOLOGY

## 2021-11-30 PROCEDURE — 82248 BILIRUBIN DIRECT: CPT | Performed by: INTERNAL MEDICINE

## 2021-11-30 PROCEDURE — 258N000003 HC RX IP 258 OP 636: Performed by: ANESTHESIOLOGY

## 2021-11-30 PROCEDURE — 250N000011 HC RX IP 250 OP 636

## 2021-11-30 PROCEDURE — 86900 BLOOD TYPING SEROLOGIC ABO: CPT | Performed by: INTERNAL MEDICINE

## 2021-11-30 PROCEDURE — 272N000001 HC OR GENERAL SUPPLY STERILE: Performed by: COLON & RECTAL SURGERY

## 2021-11-30 PROCEDURE — 88304 TISSUE EXAM BY PATHOLOGIST: CPT | Mod: TC | Performed by: COLON & RECTAL SURGERY

## 2021-11-30 PROCEDURE — 250N000013 HC RX MED GY IP 250 OP 250 PS 637: Performed by: ANESTHESIOLOGY

## 2021-11-30 PROCEDURE — 710N000010 HC RECOVERY PHASE 1, LEVEL 2, PER MIN: Performed by: COLON & RECTAL SURGERY

## 2021-11-30 PROCEDURE — 258N000003 HC RX IP 258 OP 636: Performed by: INTERNAL MEDICINE

## 2021-11-30 RX ORDER — BUPIVACAINE HYDROCHLORIDE AND EPINEPHRINE 2.5; 5 MG/ML; UG/ML
INJECTION, SOLUTION INFILTRATION; PERINEURAL PRN
Status: DISCONTINUED | OUTPATIENT
Start: 2021-11-30 | End: 2021-11-30 | Stop reason: HOSPADM

## 2021-11-30 RX ORDER — FENTANYL CITRATE 50 UG/ML
50 INJECTION, SOLUTION INTRAMUSCULAR; INTRAVENOUS EVERY 5 MIN PRN
Status: DISCONTINUED | OUTPATIENT
Start: 2021-11-30 | End: 2021-11-30 | Stop reason: HOSPADM

## 2021-11-30 RX ORDER — SODIUM CHLORIDE, SODIUM LACTATE, POTASSIUM CHLORIDE, CALCIUM CHLORIDE 600; 310; 30; 20 MG/100ML; MG/100ML; MG/100ML; MG/100ML
INJECTION, SOLUTION INTRAVENOUS CONTINUOUS
Status: DISCONTINUED | OUTPATIENT
Start: 2021-11-30 | End: 2021-12-01

## 2021-11-30 RX ORDER — ONDANSETRON 4 MG/1
4 TABLET, ORALLY DISINTEGRATING ORAL EVERY 30 MIN PRN
Status: DISCONTINUED | OUTPATIENT
Start: 2021-11-30 | End: 2021-11-30 | Stop reason: HOSPADM

## 2021-11-30 RX ORDER — OXYCODONE HYDROCHLORIDE 5 MG/1
5 TABLET ORAL EVERY 4 HOURS PRN
Status: DISCONTINUED | OUTPATIENT
Start: 2021-11-30 | End: 2021-11-30 | Stop reason: HOSPADM

## 2021-11-30 RX ORDER — HYDRALAZINE HYDROCHLORIDE 20 MG/ML
5-10 INJECTION INTRAMUSCULAR; INTRAVENOUS EVERY 10 MIN PRN
Status: DISCONTINUED | OUTPATIENT
Start: 2021-11-30 | End: 2021-11-30 | Stop reason: HOSPADM

## 2021-11-30 RX ORDER — CEFAZOLIN SODIUM 2 G/100ML
2 INJECTION, SOLUTION INTRAVENOUS SEE ADMIN INSTRUCTIONS
Status: DISCONTINUED | OUTPATIENT
Start: 2021-11-30 | End: 2021-11-30 | Stop reason: HOSPADM

## 2021-11-30 RX ORDER — PROPOFOL 10 MG/ML
INJECTION, EMULSION INTRAVENOUS CONTINUOUS PRN
Status: DISCONTINUED | OUTPATIENT
Start: 2021-11-30 | End: 2021-11-30

## 2021-11-30 RX ORDER — ONDANSETRON 4 MG/1
4 TABLET, ORALLY DISINTEGRATING ORAL EVERY 6 HOURS PRN
Status: DISCONTINUED | OUTPATIENT
Start: 2021-11-30 | End: 2021-12-01 | Stop reason: HOSPADM

## 2021-11-30 RX ORDER — LIDOCAINE 40 MG/G
CREAM TOPICAL
Status: DISCONTINUED | OUTPATIENT
Start: 2021-11-30 | End: 2021-12-01 | Stop reason: HOSPADM

## 2021-11-30 RX ORDER — MULTIPLE VITAMINS W/ MINERALS TAB 9MG-400MCG
1 TAB ORAL DAILY
COMMUNITY
End: 2023-01-23

## 2021-11-30 RX ORDER — MEPERIDINE HYDROCHLORIDE 25 MG/ML
12.5 INJECTION INTRAMUSCULAR; INTRAVENOUS; SUBCUTANEOUS
Status: DISCONTINUED | OUTPATIENT
Start: 2021-11-30 | End: 2021-11-30 | Stop reason: HOSPADM

## 2021-11-30 RX ORDER — LABETALOL HYDROCHLORIDE 5 MG/ML
10 INJECTION, SOLUTION INTRAVENOUS
Status: DISCONTINUED | OUTPATIENT
Start: 2021-11-30 | End: 2021-11-30 | Stop reason: HOSPADM

## 2021-11-30 RX ORDER — CEFAZOLIN SODIUM 2 G/100ML
2 INJECTION, SOLUTION INTRAVENOUS
Status: DISCONTINUED | OUTPATIENT
Start: 2021-11-30 | End: 2021-11-30

## 2021-11-30 RX ORDER — HYDROMORPHONE HYDROCHLORIDE 1 MG/ML
0.5 INJECTION, SOLUTION INTRAMUSCULAR; INTRAVENOUS; SUBCUTANEOUS EVERY 5 MIN PRN
Status: DISCONTINUED | OUTPATIENT
Start: 2021-11-30 | End: 2021-11-30 | Stop reason: HOSPADM

## 2021-11-30 RX ORDER — NALOXONE HYDROCHLORIDE 0.4 MG/ML
0.4 INJECTION, SOLUTION INTRAMUSCULAR; INTRAVENOUS; SUBCUTANEOUS
Status: DISCONTINUED | OUTPATIENT
Start: 2021-11-30 | End: 2021-12-01 | Stop reason: HOSPADM

## 2021-11-30 RX ORDER — NICOTINE 21 MG/24HR
1 PATCH, TRANSDERMAL 24 HOURS TRANSDERMAL DAILY
Status: DISCONTINUED | OUTPATIENT
Start: 2021-11-30 | End: 2021-12-01 | Stop reason: HOSPADM

## 2021-11-30 RX ORDER — HEPARIN SODIUM 5000 [USP'U]/.5ML
5000 INJECTION, SOLUTION INTRAVENOUS; SUBCUTANEOUS
Status: COMPLETED | OUTPATIENT
Start: 2021-11-30 | End: 2021-11-30

## 2021-11-30 RX ORDER — NALOXONE HYDROCHLORIDE 0.4 MG/ML
0.2 INJECTION, SOLUTION INTRAMUSCULAR; INTRAVENOUS; SUBCUTANEOUS
Status: DISCONTINUED | OUTPATIENT
Start: 2021-11-30 | End: 2021-12-01 | Stop reason: HOSPADM

## 2021-11-30 RX ORDER — ONDANSETRON 2 MG/ML
4 INJECTION INTRAMUSCULAR; INTRAVENOUS EVERY 6 HOURS PRN
Status: DISCONTINUED | OUTPATIENT
Start: 2021-11-30 | End: 2021-12-01 | Stop reason: HOSPADM

## 2021-11-30 RX ORDER — SODIUM CHLORIDE, SODIUM LACTATE, POTASSIUM CHLORIDE, CALCIUM CHLORIDE 600; 310; 30; 20 MG/100ML; MG/100ML; MG/100ML; MG/100ML
INJECTION, SOLUTION INTRAVENOUS CONTINUOUS
Status: DISCONTINUED | OUTPATIENT
Start: 2021-11-30 | End: 2021-11-30 | Stop reason: HOSPADM

## 2021-11-30 RX ORDER — OXYCODONE HYDROCHLORIDE 5 MG/1
10 TABLET ORAL EVERY 4 HOURS PRN
Status: DISCONTINUED | OUTPATIENT
Start: 2021-11-30 | End: 2021-12-01 | Stop reason: HOSPADM

## 2021-11-30 RX ORDER — CELECOXIB 200 MG/1
400 CAPSULE ORAL ONCE
Status: COMPLETED | OUTPATIENT
Start: 2021-11-30 | End: 2021-11-30

## 2021-11-30 RX ORDER — FENTANYL CITRATE 50 UG/ML
INJECTION, SOLUTION INTRAMUSCULAR; INTRAVENOUS PRN
Status: DISCONTINUED | OUTPATIENT
Start: 2021-11-30 | End: 2021-11-30

## 2021-11-30 RX ORDER — ACETAMINOPHEN 325 MG/1
975 TABLET ORAL ONCE
Status: DISCONTINUED | OUTPATIENT
Start: 2021-11-30 | End: 2021-11-30

## 2021-11-30 RX ORDER — PHENYLEPHRINE HYDROCHLORIDE 10 MG/ML
INJECTION INTRAVENOUS PRN
Status: DISCONTINUED | OUTPATIENT
Start: 2021-11-30 | End: 2021-11-30

## 2021-11-30 RX ORDER — SERTRALINE HYDROCHLORIDE 100 MG/1
100 TABLET, FILM COATED ORAL DAILY
Status: DISCONTINUED | OUTPATIENT
Start: 2021-11-30 | End: 2021-12-01 | Stop reason: HOSPADM

## 2021-11-30 RX ORDER — PROPOFOL 10 MG/ML
INJECTION, EMULSION INTRAVENOUS PRN
Status: DISCONTINUED | OUTPATIENT
Start: 2021-11-30 | End: 2021-11-30

## 2021-11-30 RX ORDER — ALBUTEROL SULFATE 0.83 MG/ML
2.5 SOLUTION RESPIRATORY (INHALATION) EVERY 4 HOURS PRN
Status: DISCONTINUED | OUTPATIENT
Start: 2021-11-30 | End: 2021-11-30 | Stop reason: HOSPADM

## 2021-11-30 RX ORDER — ONDANSETRON 2 MG/ML
INJECTION INTRAMUSCULAR; INTRAVENOUS PRN
Status: DISCONTINUED | OUTPATIENT
Start: 2021-11-30 | End: 2021-11-30

## 2021-11-30 RX ORDER — GLYCOPYRROLATE 0.2 MG/ML
INJECTION, SOLUTION INTRAMUSCULAR; INTRAVENOUS PRN
Status: DISCONTINUED | OUTPATIENT
Start: 2021-11-30 | End: 2021-11-30

## 2021-11-30 RX ORDER — ALVIMOPAN 12 MG/1
12 CAPSULE ORAL ONCE
Status: COMPLETED | OUTPATIENT
Start: 2021-11-30 | End: 2021-11-30

## 2021-11-30 RX ORDER — HYDROMORPHONE HCL IN WATER/PF 6 MG/30 ML
0.4 PATIENT CONTROLLED ANALGESIA SYRINGE INTRAVENOUS
Status: DISCONTINUED | OUTPATIENT
Start: 2021-11-30 | End: 2021-12-01 | Stop reason: HOSPADM

## 2021-11-30 RX ORDER — HYDROMORPHONE HCL IN WATER/PF 6 MG/30 ML
0.2 PATIENT CONTROLLED ANALGESIA SYRINGE INTRAVENOUS
Status: DISCONTINUED | OUTPATIENT
Start: 2021-11-30 | End: 2021-12-01 | Stop reason: HOSPADM

## 2021-11-30 RX ORDER — KETOROLAC TROMETHAMINE 30 MG/ML
INJECTION, SOLUTION INTRAMUSCULAR; INTRAVENOUS PRN
Status: DISCONTINUED | OUTPATIENT
Start: 2021-11-30 | End: 2021-11-30

## 2021-11-30 RX ORDER — MAGNESIUM HYDROXIDE 1200 MG/15ML
LIQUID ORAL PRN
Status: DISCONTINUED | OUTPATIENT
Start: 2021-11-30 | End: 2021-11-30 | Stop reason: HOSPADM

## 2021-11-30 RX ORDER — ACETAMINOPHEN 325 MG/1
650 TABLET ORAL EVERY 4 HOURS PRN
Status: DISCONTINUED | OUTPATIENT
Start: 2021-12-03 | End: 2021-12-01 | Stop reason: HOSPADM

## 2021-11-30 RX ORDER — FENTANYL CITRATE 50 UG/ML
25 INJECTION, SOLUTION INTRAMUSCULAR; INTRAVENOUS
Status: DISCONTINUED | OUTPATIENT
Start: 2021-11-30 | End: 2021-11-30 | Stop reason: HOSPADM

## 2021-11-30 RX ORDER — OXYCODONE HYDROCHLORIDE 5 MG/1
5 TABLET ORAL EVERY 4 HOURS PRN
Status: DISCONTINUED | OUTPATIENT
Start: 2021-11-30 | End: 2021-12-01 | Stop reason: HOSPADM

## 2021-11-30 RX ORDER — DEXAMETHASONE SODIUM PHOSPHATE 4 MG/ML
INJECTION, SOLUTION INTRA-ARTICULAR; INTRALESIONAL; INTRAMUSCULAR; INTRAVENOUS; SOFT TISSUE PRN
Status: DISCONTINUED | OUTPATIENT
Start: 2021-11-30 | End: 2021-11-30

## 2021-11-30 RX ORDER — BUPIVACAINE HYDROCHLORIDE AND EPINEPHRINE 2.5; 5 MG/ML; UG/ML
30 INJECTION, SOLUTION EPIDURAL; INFILTRATION; INTRACAUDAL; PERINEURAL ONCE
Status: DISCONTINUED | OUTPATIENT
Start: 2021-11-30 | End: 2021-11-30

## 2021-11-30 RX ORDER — LIDOCAINE 40 MG/G
CREAM TOPICAL
Status: DISCONTINUED | OUTPATIENT
Start: 2021-11-30 | End: 2021-11-30 | Stop reason: HOSPADM

## 2021-11-30 RX ORDER — ACETAMINOPHEN 325 MG/1
975 TABLET ORAL ONCE
Status: COMPLETED | OUTPATIENT
Start: 2021-11-30 | End: 2021-11-30

## 2021-11-30 RX ORDER — NEOSTIGMINE METHYLSULFATE 1 MG/ML
VIAL (ML) INJECTION PRN
Status: DISCONTINUED | OUTPATIENT
Start: 2021-11-30 | End: 2021-11-30

## 2021-11-30 RX ORDER — CEFAZOLIN SODIUM/WATER 2 G/20 ML
2 SYRINGE (ML) INTRAVENOUS
Status: DISCONTINUED | OUTPATIENT
Start: 2021-11-30 | End: 2021-11-30 | Stop reason: HOSPADM

## 2021-11-30 RX ORDER — ACETAMINOPHEN 325 MG/1
975 TABLET ORAL EVERY 8 HOURS
Status: DISCONTINUED | OUTPATIENT
Start: 2021-11-30 | End: 2021-12-01 | Stop reason: HOSPADM

## 2021-11-30 RX ORDER — ONDANSETRON 2 MG/ML
4 INJECTION INTRAMUSCULAR; INTRAVENOUS EVERY 30 MIN PRN
Status: DISCONTINUED | OUTPATIENT
Start: 2021-11-30 | End: 2021-11-30 | Stop reason: HOSPADM

## 2021-11-30 RX ORDER — QUETIAPINE FUMARATE 100 MG/1
100 TABLET, FILM COATED ORAL AT BEDTIME
Status: DISCONTINUED | OUTPATIENT
Start: 2021-11-30 | End: 2021-12-01 | Stop reason: HOSPADM

## 2021-11-30 RX ORDER — IBUPROFEN 600 MG/1
600 TABLET, FILM COATED ORAL EVERY 6 HOURS PRN
Status: ON HOLD | COMMUNITY
End: 2021-12-01

## 2021-11-30 RX ORDER — HYDROMORPHONE HCL IN WATER/PF 6 MG/30 ML
0.2 PATIENT CONTROLLED ANALGESIA SYRINGE INTRAVENOUS EVERY 5 MIN PRN
Status: DISCONTINUED | OUTPATIENT
Start: 2021-11-30 | End: 2021-11-30 | Stop reason: HOSPADM

## 2021-11-30 RX ORDER — FENTANYL CITRATE 50 UG/ML
25 INJECTION, SOLUTION INTRAMUSCULAR; INTRAVENOUS EVERY 5 MIN PRN
Status: DISCONTINUED | OUTPATIENT
Start: 2021-11-30 | End: 2021-11-30 | Stop reason: HOSPADM

## 2021-11-30 RX ORDER — LORAZEPAM 2 MG/ML
.5-1 INJECTION INTRAMUSCULAR
Status: DISCONTINUED | OUTPATIENT
Start: 2021-11-30 | End: 2021-11-30 | Stop reason: HOSPADM

## 2021-11-30 RX ADMIN — DEXAMETHASONE SODIUM PHOSPHATE 4 MG: 4 INJECTION, SOLUTION INTRA-ARTICULAR; INTRALESIONAL; INTRAMUSCULAR; INTRAVENOUS; SOFT TISSUE at 12:41

## 2021-11-30 RX ADMIN — HYDROMORPHONE HYDROCHLORIDE 0.5 MG: 1 INJECTION, SOLUTION INTRAMUSCULAR; INTRAVENOUS; SUBCUTANEOUS at 14:29

## 2021-11-30 RX ADMIN — PROPOFOL 150 MG: 10 INJECTION, EMULSION INTRAVENOUS at 12:41

## 2021-11-30 RX ADMIN — SODIUM CHLORIDE, POTASSIUM CHLORIDE, SODIUM LACTATE AND CALCIUM CHLORIDE 1000 ML: 600; 310; 30; 20 INJECTION, SOLUTION INTRAVENOUS at 12:10

## 2021-11-30 RX ADMIN — SODIUM CHLORIDE, POTASSIUM CHLORIDE, SODIUM LACTATE AND CALCIUM CHLORIDE: 600; 310; 30; 20 INJECTION, SOLUTION INTRAVENOUS at 23:19

## 2021-11-30 RX ADMIN — FENTANYL CITRATE 150 MCG: 50 INJECTION, SOLUTION INTRAMUSCULAR; INTRAVENOUS at 12:41

## 2021-11-30 RX ADMIN — LIDOCAINE HYDROCHLORIDE 30 MG: 10 INJECTION, SOLUTION EPIDURAL; INFILTRATION; INTRACAUDAL; PERINEURAL at 12:41

## 2021-11-30 RX ADMIN — PHENYLEPHRINE HYDROCHLORIDE 100 MCG: 10 INJECTION INTRAVENOUS at 12:50

## 2021-11-30 RX ADMIN — ACETAMINOPHEN 975 MG: 325 TABLET, FILM COATED ORAL at 11:34

## 2021-11-30 RX ADMIN — NEOSTIGMINE METHYLSULFATE 4 MG: 1 INJECTION, SOLUTION INTRAVENOUS at 14:20

## 2021-11-30 RX ADMIN — ALVIMOPAN 12 MG: 12 CAPSULE ORAL at 11:50

## 2021-11-30 RX ADMIN — HYDROMORPHONE HYDROCHLORIDE 0.5 MG: 1 INJECTION, SOLUTION INTRAMUSCULAR; INTRAVENOUS; SUBCUTANEOUS at 13:58

## 2021-11-30 RX ADMIN — ACETAMINOPHEN 975 MG: 325 TABLET, FILM COATED ORAL at 20:05

## 2021-11-30 RX ADMIN — METRONIDAZOLE 500 MG: 500 INJECTION, SOLUTION INTRAVENOUS at 11:45

## 2021-11-30 RX ADMIN — ROCURONIUM BROMIDE 40 MG: 50 INJECTION, SOLUTION INTRAVENOUS at 12:41

## 2021-11-30 RX ADMIN — MIDAZOLAM 1 MG: 1 INJECTION INTRAMUSCULAR; INTRAVENOUS at 12:30

## 2021-11-30 RX ADMIN — FENTANYL CITRATE 50 MCG: 50 INJECTION, SOLUTION INTRAMUSCULAR; INTRAVENOUS at 13:47

## 2021-11-30 RX ADMIN — ONDANSETRON HYDROCHLORIDE 4 MG: 2 INJECTION, SOLUTION INTRAVENOUS at 13:06

## 2021-11-30 RX ADMIN — CELECOXIB 400 MG: 200 CAPSULE ORAL at 11:49

## 2021-11-30 RX ADMIN — SODIUM CHLORIDE, POTASSIUM CHLORIDE, SODIUM LACTATE AND CALCIUM CHLORIDE: 600; 310; 30; 20 INJECTION, SOLUTION INTRAVENOUS at 11:57

## 2021-11-30 RX ADMIN — KETOROLAC TROMETHAMINE 30 MG: 30 INJECTION, SOLUTION INTRAMUSCULAR at 14:30

## 2021-11-30 RX ADMIN — GLYCOPYRROLATE 0.5 MG: 0.2 INJECTION, SOLUTION INTRAMUSCULAR; INTRAVENOUS at 14:20

## 2021-11-30 RX ADMIN — FENTANYL CITRATE 50 MCG: 50 INJECTION INTRAMUSCULAR; INTRAVENOUS at 15:27

## 2021-11-30 RX ADMIN — HEPARIN SODIUM 5000 UNITS: 10000 INJECTION, SOLUTION INTRAVENOUS; SUBCUTANEOUS at 12:11

## 2021-11-30 RX ADMIN — SODIUM CHLORIDE, POTASSIUM CHLORIDE, SODIUM LACTATE AND CALCIUM CHLORIDE: 600; 310; 30; 20 INJECTION, SOLUTION INTRAVENOUS at 15:05

## 2021-11-30 RX ADMIN — MIDAZOLAM 1 MG: 1 INJECTION INTRAMUSCULAR; INTRAVENOUS at 12:39

## 2021-11-30 RX ADMIN — GLYCOPYRROLATE 0.2 MG: 0.2 INJECTION, SOLUTION INTRAMUSCULAR; INTRAVENOUS at 12:56

## 2021-11-30 RX ADMIN — FENTANYL CITRATE 50 MCG: 50 INJECTION, SOLUTION INTRAMUSCULAR; INTRAVENOUS at 13:02

## 2021-11-30 RX ADMIN — PROPOFOL 50 MCG/KG/MIN: 10 INJECTION, EMULSION INTRAVENOUS at 12:47

## 2021-11-30 RX ADMIN — SODIUM CHLORIDE, POTASSIUM CHLORIDE, SODIUM LACTATE AND CALCIUM CHLORIDE: 600; 310; 30; 20 INJECTION, SOLUTION INTRAVENOUS at 13:02

## 2021-11-30 ASSESSMENT — ACTIVITIES OF DAILY LIVING (ADL)
ADLS_ACUITY_SCORE: 3
ADLS_ACUITY_SCORE: 4
ADLS_ACUITY_SCORE: 3
ADLS_ACUITY_SCORE: 4
ADLS_ACUITY_SCORE: 3
ADLS_ACUITY_SCORE: 4

## 2021-11-30 ASSESSMENT — LIFESTYLE VARIABLES: TOBACCO_USE: 1

## 2021-11-30 ASSESSMENT — MIFFLIN-ST. JEOR: SCORE: 1352.05

## 2021-11-30 NOTE — PROGRESS NOTES
RAPID RESPONSE NOTE    RRT called secondary to AMS.  Pt seen and examined. Upon arrival, pt alert but quite confused.  Per report, pt AOx4 prior to leaving PACU.  Pt had a colostomy takedown.  Pt became more alert over the course of the RRT.  Also of note, the patient was found using a vape pen by nursing prior to RRT.  Pt states it is nicotine.  Vital signs stable.    Constitutional: Awake, alert, cooperative.  Eyes: Conjunctiva and pupils examined and normal.  HEENT: Moist mucous membranes, normal dentition.  Respiratory: Clear to auscultation bilaterally, no crackles or wheezing.  Cardiovascular: Regular rate and rhythm, normal S1 and S2, and no murmur noted.  GI: Soft, non-distended, non-tender, normal bowel sounds.  Lymph/Hematologic: No anterior cervical or supraclavicular adenopathy.  Skin: No rashes, no cyanosis, no edema.  Musculoskeletal: No joint swelling, erythema or tenderness.  Neurologic: Cranial nerves 2-12 intact, normal strength and sensation.  Psychiatric: Alert, oriented to person, place, no obvious anxiety or depression.    Per MAR, pt received IV fentanyl at 1347 (50 mcg), 1527 (50 mcg), IV dilaudid at 1429 (0.5 mg), versed at 1230 and 1239 (1 mg each).    Check CBC, BMP, Hepatic Panel, LA, Ammonia, Stat type and screen, ABG.  Vap pen locked up and will ask security to search pt's bag with suspicion for self-medicating behavior.      Discussed with Dr. Blackwell via phone.  Colorectal Surgery at bedside as well.  Incision site looks ok.  Will follow.
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
## 2021-11-30 NOTE — ANESTHESIA PROCEDURE NOTES
Airway       Patient location during procedure: OR       Procedure Start/Stop Times: 11/30/2021 12:44 PM  Staff -        Anesthesiologist:  Medardo Finn MD       CRNA: Cesar Newton APRN CRNA       Performed By: CRNA  Consent for Airway        Urgency: elective  Indications and Patient Condition       Indications for airway management: george-procedural         Mask difficulty assessment: 2 - vent by mask + OA or adjuvant +/- NMBA    Final Airway Details       Final airway type: endotracheal airway       Successful airway: ETT - single  Endotracheal Airway Details        ETT size (mm): 7.0       Cuffed: yes       Successful intubation technique: direct laryngoscopy       DL Blade Type: MAC 3       Grade View of Cords: 1       Adjucts: stylet       Position: Right       Measured from: lips       Secured at (cm): 23       Bite block used: Oral Airway    Post intubation assessment        Placement verified by: capnometry, equal breath sounds and chest rise        Number of attempts at approach: 1       Number of other approaches attempted: 0       Secured with: plastic tape       Ease of procedure: easy       Dentition: Intact and Unchanged

## 2021-11-30 NOTE — BRIEF OP NOTE
[facility]    Brief Operative Note    Pre-operative diagnosis: Colostomy status (H) [Z93.3]  Post-operative diagnosis Same as pre-operative diagnosis    Procedure: Procedure(s):  Open Colostomy Closure  Surgeon: Surgeon(s) and Role:     * Shanti Blackwell MD - Primary      * Celeste Llanos PA-C - Assisting  Anesthesia: General   Estimated Blood Loss: Minimal    Drains: None  Specimens:   ID Type Source Tests Collected by Time Destination   1 : Colostomy  Tissue Large Intestine, Colon SURGICAL PATHOLOGY EXAM Shanti Blackwell MD 11/30/2021  1:57 PM      Findings:   Expected anatomy.  Complications: None.  Implants: * No implants in log *       Condition on discharge from OR: Satisfactory    Celeste Llanos PA-C   [facility]   [phone].        ADDENDUM:    PATIENT DATA  Indicate Y or N:  Home O2 No  Hemodialysis  No  Transplant patient  No  Cirrhosis  No  Steroids in last 30 days  No  Immunomodulators in last 30 days  No  Anticoagulation at time of surgery  No   List medication N/A  Prior abdominal surgery  Yes  Pelvic irradiation  No    Albumin within 30 days if known - unknown   Hgb within 30 days if known     Hemoglobin   Date Value Ref Range Status   03/09/2021 8.7 (L) 11.7 - 15.7 g/dL Final   ]  Cr within 30 days if known    Creatinine   Date Value Ref Range Status   10/26/2012 0.71 0.52 - 1.04 mg/dL Final   ]  Body mass index is 22.52 kg/m .      OR DATA  Emergent  No   <24 hours  No   <1 week  No  Bowel Prep Yes  Antibiotics  Yes  DVT prophylaxis    Heparin  Yes   SCD  Yes   None  No  Drain  No  ASA (1,2,3,4) 2  OR time (min) 84  Stents  No  Transfuse >/= 2U  No  Anastomosis   Stapled  Yes   Handsewn  No  Leak Test    Positive  No   Negative  No   Not done  Yes
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
## 2021-11-30 NOTE — ANESTHESIA CARE TRANSFER NOTE
Patient: Lavern Rodriguez    Procedure: Procedure(s):  Open Colostomy Closure       Diagnosis: Colostomy status (H) [Z93.3]  Diagnosis Additional Information: No value filed.    Anesthesia Type:   General     Note:    Oropharynx: spontaneously breathing  Level of Consciousness: awake  Oxygen Supplementation: face mask  Level of Supplemental Oxygen (L/min / FiO2): 6l  Independent Airway: airway patency satisfactory and stable  Dentition: dentition unchanged  Vital Signs Stable: post-procedure vital signs reviewed and stable  Report to RN Given: handoff report given  Patient transferred to: PACU  Comments: Pt to VIKI BOBBY, report to RN  Handoff Report: Identifed the Patient, Identified the Reponsible Provider, Reviewed the pertinent medical history, Discussed the surgical course, Reviewed Intra-OP anesthesia mangement and issues during anesthesia, Set expectations for post-procedure period and Allowed opportunity for questions and acknowledgement of understanding      Vitals:  Vitals Value Taken Time   BP     Temp     Pulse 70 11/30/21 1445   Resp 9 11/30/21 1445   SpO2 100 % 11/30/21 1445   Vitals shown include unvalidated device data.    Electronically Signed By: AVTAR Pierre CRNA  November 30, 2021  2:46 PM

## 2021-12-01 VITALS
HEIGHT: 66 IN | WEIGHT: 137.4 LBS | RESPIRATION RATE: 20 BRPM | OXYGEN SATURATION: 100 % | DIASTOLIC BLOOD PRESSURE: 57 MMHG | BODY MASS INDEX: 22.08 KG/M2 | HEART RATE: 63 BPM | SYSTOLIC BLOOD PRESSURE: 102 MMHG | TEMPERATURE: 98.4 F

## 2021-12-01 LAB
ANION GAP SERPL CALCULATED.3IONS-SCNC: 2 MMOL/L (ref 3–14)
BUN SERPL-MCNC: 19 MG/DL (ref 7–30)
CALCIUM SERPL-MCNC: 8.3 MG/DL (ref 8.5–10.1)
CHLORIDE BLD-SCNC: 114 MMOL/L (ref 94–109)
CO2 SERPL-SCNC: 26 MMOL/L (ref 20–32)
CREAT SERPL-MCNC: 0.76 MG/DL (ref 0.52–1.04)
ERYTHROCYTE [DISTWIDTH] IN BLOOD BY AUTOMATED COUNT: 13.3 % (ref 10–15)
GFR SERPL CREATININE-BSD FRML MDRD: >90 ML/MIN/1.73M2
GLUCOSE BLD-MCNC: 99 MG/DL (ref 70–99)
HCT VFR BLD AUTO: 30.7 % (ref 35–47)
HGB BLD-MCNC: 9.9 G/DL (ref 11.7–15.7)
LACTATE SERPL-SCNC: 0.7 MMOL/L (ref 0.7–2)
MCH RBC QN AUTO: 29.1 PG (ref 26.5–33)
MCHC RBC AUTO-ENTMCNC: 32.2 G/DL (ref 31.5–36.5)
MCV RBC AUTO: 90 FL (ref 78–100)
PATH REPORT.COMMENTS IMP SPEC: NORMAL
PATH REPORT.COMMENTS IMP SPEC: NORMAL
PATH REPORT.FINAL DX SPEC: NORMAL
PATH REPORT.GROSS SPEC: NORMAL
PATH REPORT.MICROSCOPIC SPEC OTHER STN: NORMAL
PATH REPORT.RELEVANT HX SPEC: NORMAL
PHOTO IMAGE: NORMAL
PLATELET # BLD AUTO: 239 10E3/UL (ref 150–450)
POTASSIUM BLD-SCNC: 3.7 MMOL/L (ref 3.4–5.3)
RBC # BLD AUTO: 3.4 10E6/UL (ref 3.8–5.2)
SODIUM SERPL-SCNC: 142 MMOL/L (ref 133–144)
WBC # BLD AUTO: 7.7 10E3/UL (ref 4–11)

## 2021-12-01 PROCEDURE — 82374 ASSAY BLOOD CARBON DIOXIDE: CPT | Performed by: INTERNAL MEDICINE

## 2021-12-01 PROCEDURE — 36415 COLL VENOUS BLD VENIPUNCTURE: CPT | Performed by: INTERNAL MEDICINE

## 2021-12-01 PROCEDURE — 88304 TISSUE EXAM BY PATHOLOGIST: CPT | Mod: 26 | Performed by: PATHOLOGY

## 2021-12-01 PROCEDURE — 250N000013 HC RX MED GY IP 250 OP 250 PS 637: Performed by: INTERNAL MEDICINE

## 2021-12-01 PROCEDURE — 85027 COMPLETE CBC AUTOMATED: CPT | Performed by: INTERNAL MEDICINE

## 2021-12-01 PROCEDURE — 250N000013 HC RX MED GY IP 250 OP 250 PS 637: Performed by: COLON & RECTAL SURGERY

## 2021-12-01 PROCEDURE — 99231 SBSQ HOSP IP/OBS SF/LOW 25: CPT | Performed by: INTERNAL MEDICINE

## 2021-12-01 PROCEDURE — 250N000011 HC RX IP 250 OP 636: Performed by: COLON & RECTAL SURGERY

## 2021-12-01 PROCEDURE — 83605 ASSAY OF LACTIC ACID: CPT | Performed by: INTERNAL MEDICINE

## 2021-12-01 PROCEDURE — 99207 PR CDG-MDM COMPONENT: MEETS LOW - DOWN CODED: CPT | Performed by: INTERNAL MEDICINE

## 2021-12-01 RX ORDER — OXYCODONE HYDROCHLORIDE 5 MG/1
5 TABLET ORAL EVERY 6 HOURS PRN
Qty: 15 TABLET | Refills: 0 | Status: SHIPPED | OUTPATIENT
Start: 2021-12-01 | End: 2023-01-23

## 2021-12-01 RX ADMIN — Medication 10 MG: at 00:18

## 2021-12-01 RX ADMIN — ENOXAPARIN SODIUM 40 MG: 40 INJECTION SUBCUTANEOUS at 14:22

## 2021-12-01 RX ADMIN — ACETAMINOPHEN 975 MG: 325 TABLET, FILM COATED ORAL at 14:20

## 2021-12-01 RX ADMIN — ACETAMINOPHEN 975 MG: 325 TABLET, FILM COATED ORAL at 04:26

## 2021-12-01 ASSESSMENT — ACTIVITIES OF DAILY LIVING (ADL)
ADLS_ACUITY_SCORE: 3

## 2021-12-01 NOTE — PROVIDER NOTIFICATION
DATE:  11/30/2021   TIME OF RECEIPT FROM LAB:  1800  LAB TEST:  Lactic Acid  LAB VALUE:  6.6  RESULTS GIVEN WITH READ-BACK TO: Dr. Jesus Marie  TIME LAB VALUE REPORTED TO PROVIDER:  3121

## 2021-12-01 NOTE — ANESTHESIA POSTPROCEDURE EVALUATION
Patient: Lavern Rodriguez    Procedure: Procedure(s):  Open Colostomy Closure       Diagnosis:Colostomy status (H) [Z93.3]  Diagnosis Additional Information: No value filed.    Anesthesia Type:  General    Note:  Disposition: Inpatient   Postop Pain Control: Uneventful            Sign Out: Well controlled pain   PONV: No   Neuro/Psych: Uneventful            Sign Out: Acceptable/Baseline neuro status   Airway/Respiratory: Uneventful            Sign Out: Acceptable/Baseline resp. status   CV/Hemodynamics: Uneventful            Sign Out: Acceptable CV status; No obvious hypovolemia; No obvious fluid overload   Other NRE: NONE   DID A NON-ROUTINE EVENT OCCUR? No           Last vitals:  Vitals Value Taken Time   /97 11/30/21 1550   Temp 97  F (36.1  C) 11/30/21 1525   Pulse 60 11/30/21 1555   Resp 27 11/30/21 1555   SpO2 100 % 11/30/21 1545   Vitals shown include unvalidated device data.    Electronically Signed By: Medardo Finn MD  November 30, 2021  6:08 PM
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
## 2021-12-01 NOTE — PROGRESS NOTES
End of Shift Summary  For vital signs and complete assessments, please see documentation flowsheets.     Pertinent assessments: Pt A&O, soft BP's, on RA, minimal pain this shift, only taking scheduled Tylenol for abdominal pain. Denies any nausea. BS faint, abdominal dressing reinforced. Voiding without difficulty.     Major Shift Events: lost IV access, IVF stopped     Treatment Plan: pain/nausea management, advance diet as tolerated     Bedside Nurse: Ariane Urbano RN

## 2021-12-01 NOTE — PROGRESS NOTES
"Colon & Rectal Surgery Progress Note             Interval History:   Postop Day #: 1  unresposive episode yesterday. Clear this morning, feeling well. Tolerating clears, voiding well. Minimal pain. IV out.                Medications:   I have reviewed this patient's current medications               Physical Exam:   Blood pressure 91/48, pulse 66, temperature 98.3  F (36.8  C), temperature source Oral, resp. rate 16, height 1.664 m (5' 5.5\"), weight 62.3 kg (137 lb 6.4 oz), last menstrual period 11/25/2021, SpO2 100 %, unknown if currently breastfeeding.    Intake/Output Summary (Last 24 hours) at 12/1/2021 0822  Last data filed at 11/30/2021 2319  Gross per 24 hour   Intake 3006 ml   Output 170 ml   Net 2836 ml     GEN:  alert  ABD:  Soft, stoma site clean, dressign changed and pt educated on packing.         Data:        Lab Results   Component Value Date     11/30/2021     10/26/2012    Lab Results   Component Value Date    CHLORIDE 107 11/30/2021    CHLORIDE 105 10/26/2012    Lab Results   Component Value Date    BUN 13 11/30/2021    BUN 14 10/26/2012      Lab Results   Component Value Date    POTASSIUM 3.3 11/30/2021    POTASSIUM 4.7 10/26/2012    Lab Results   Component Value Date    CO2 23 11/30/2021    CO2 26 10/26/2012    Lab Results   Component Value Date    CR 0.68 11/30/2021    CR 0.71 10/26/2012        Lab Results   Component Value Date    HGB 12.9 11/30/2021    HGB 8.7 (L) 03/09/2021     Lab Results   Component Value Date     11/30/2021     03/07/2021     Lab Results   Component Value Date    WBC 9.7 11/30/2021    WBC 6.9 03/07/2021            Assessment and Plan:   Low residue diet.  Will reassess later today for possible discharge if she is tolerating diet and pain controlled.      Shanti Blackwell MD  [facility]  Office Phone # [phone]    "

## 2021-12-01 NOTE — PLAN OF CARE
Patient hospitalized for colostomy takedown. Cleared for discharge to home today per MD. Discharge instructions, medications, and follow-ups reviewed with patient in detail. Discharge medications, including Oxycodone were filled at [facility] pharmacy. Patient verbalized understanding of discharge instructions. Belongings were returned to patient at time of discharge. Patient is providing self transport home. Supplies provided for dressing changes, reviewed wound cares. Patient verbalized understanding.      Debbie Briggs RN

## 2021-12-01 NOTE — PHARMACY-ADMISSION MEDICATION HISTORY
Admission medication history interview status for this patient is complete. See [facility] admission navigator for allergy information, prior to admission medications and immunization status.     Medication history interview done, indicate source(s): Patient  Medication history resources (including written lists, pill bottles, clinic record):None  Pharmacy: -    Changes made to PTA medication list:  Added: mvi, ibuprofen  Changed: -  Reported as Not Taking: -  Removed: -    Actions taken by pharmacist (provider contacted, etc):None     Additional medication history information:None    Medication reconciliation/reorder completed by provider prior to medication history?  yes   (Y/N)     For patients on insulin therapy:   Do you use sliding scale insulin based on blood sugars?   What is your pre-meal insulin coverage?    Do you typically eat three meals a day?   How many times do you check your blood glucose per day?   How many episodes of hypoglycemia do you typically have per month?   Do you have a Continuous Glucose Monitor (CGM)?      Prior to Admission medications    Medication Sig Last Dose Taking? Auth Provider   ibuprofen (ADVIL/MOTRIN) 600 MG tablet Take 600 mg by mouth every 6 hours as needed for moderate pain  Yes Unknown, Entered By History   Magnesium Citrate (CITRATE OF MAGNESIA) SOLN Wake up 4 hours before procedure and drink the entire bottle. Past Week at Unknown time Yes Reported, Patient   metroNIDAZOLE (FLAGYL) 500 MG tablet TAKE 1 TABLET AT 1:00PM, 2:00PM AND 11:00PM THE DAY BEFORE SURGERY 11/30/2021 at 0300 Yes Reported, Patient   multivitamin w/minerals (THERA-VIT-M) tablet Take 1 tablet by mouth daily Past Week at Unknown time Yes Unknown, Entered By History   neomycin (MYCIFRADIN) 500 MG tablet TAKE 2 TABLETS AT 1:00PM, 2:00PM AND 11:00PM THE DAY BEFORE SURGERY. Unknown at Unknown time Yes Reported, Patient   Oral Electrolytes (PEDIATRIC ELECTROLYTE) SOLN Mix with whole bottle of Miralax. Drink 8  ounces every 15 mins until gone. Unknown at Unknown time Yes Reported, Patient   polyethylene glycol (MIRALAX) 17 GM/Dose powder Mix whole bottle (8.3 oz) w/Gatorade/Powerade. Drink 8 ounces every 15 mins until gone. Past Week at Unknown time Yes Reported, Patient   QUEtiapine (SEROQUEL) 100 MG tablet Take 100 mg by mouth At Bedtime More than a month at Unknown time Yes Unknown, Entered By History   sertraline (ZOLOFT) 100 MG tablet Take 100 mg by mouth daily More than a month at have not started yet Yes Unknown, Entered By History
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
## 2021-12-02 ENCOUNTER — PATIENT OUTREACH (OUTPATIENT)
Dept: CARE COORDINATION | Facility: CLINIC | Age: 31
End: 2021-12-02
Payer: COMMERCIAL

## 2021-12-02 DIAGNOSIS — Z71.89 OTHER SPECIFIED COUNSELING: ICD-10-CM

## 2021-12-02 NOTE — PROGRESS NOTES
Clinic Care Coordination Contact  [facility]/Voicemail       Clinical Data: Care Coordinator Outreach  Outreach attempted x 1.  Left message on patient's voicemail with call back information and requested return call.  Plan:  Care Coordinator will try to reach patient again in 1-2 business days.    Talia Arroyo  Community Health Worker  [facility]  Ph:[phone]
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
## 2022-01-05 NOTE — TELEPHONE ENCOUNTER
RECORDS RECEIVED FROM: Self   REASON FOR VISIT: Surral nerve graft left perineal nerve   Date of Appt: 1/18/22   NOTES (FOR ALL VISITS) STATUS DETAILS   OFFICE NOTE from referring provider N/A    OFFICE NOTE from other specialist Care Everywhere Dr Jose Mcintosh @ [facility]:  12/27/21 11/8/21 9/13/21    Blanca MANZANO @ [facility]:  9/3/21   DISCHARGE SUMMARY from hospital Care Everywhere [facility]:  6/2/21-6/15/21    [facility]:  5/15/21-6/2/21   DISCHARGE REPORT from the ER Care Everywhere [facility]:  11/12/21   OPERATIVE REPORT N/A    MEDICATION LIST Care Everywhere    IMAGING  (FOR ALL VISITS)     EMG Care Everywhere [facility]:  10/25/21  8/3/21   EEG N/A    LUMBAR PUNCTURE N/A    POPPY SCAN N/A    ULTRASOUND (CAROTID BILAT) *VASCULAR* In process [facility]:  US Venous Lower Extrem 11/12/21   MRI (HEAD, NECK, SPINE) N/A    CT (HEAD, NECK, SPINE) N/A       Action 1/5/22 MV 11.13am   Action Taken Imaging request faxed to [facility]     APPT CANCELLED

## 2022-01-10 ENCOUNTER — E-VISIT (OUTPATIENT)
Dept: URGENT CARE | Facility: URGENT CARE | Age: 32
End: 2022-01-10
Payer: COMMERCIAL

## 2022-01-10 DIAGNOSIS — Z20.822 CLOSE EXPOSURE TO 2019 NOVEL CORONAVIRUS: Primary | ICD-10-CM

## 2022-01-10 PROCEDURE — 99207 PR NO CHARGE LOS: CPT | Performed by: FAMILY MEDICINE

## 2022-01-10 NOTE — PATIENT INSTRUCTIONS
Dear Lavern,    Based on your exposure to COVID-19 (coronavirus), we would like to test you for this virus. I have placed an order for this test. The best time for testing is 5-7 days after the exposure.    How to schedule:  Go to your [facility] home page and scroll down to the section that says  You have an appointment that needs to be scheduled  and click the large green button that says  Schedule Now  and follow the steps to find the next available opening.     If you are unable to complete these [facility] scheduling steps, please call [phone] to schedule your testing.     Monoclonal antibody treatment after exposure:  Because you have been exposed to COVID-19, you may be able to receive a treatment with monoclonal antibodies. This treatment can lower your risk of severe illness and going to the hospital. It is given through an IV or under your skin (subcutaneous) and must be given at an infusion center.   To be eligible, you must be 12 years or older, at least 88 pounds and:    Are not fully vaccinated against COVID-19, OR    Are immunocompromised     If you would like to sign up to be considered to receive the monoclonal antibody medicine, please complete a participation form through the [place] [facility] here:  MNRAP ([email or web address]). You may also call the [facility] COVID-19 Public Hotline at 1-141.784.5845 (open Mon-Fri: 9am-7pm and Sat: 10am-6pm).     Not all people who are eligible will receive the medicine since supply is limited. You will be contacted in the next 1 to 2 business days only if you are selected. If you do not receive a call, you have not been selected to receive the medicine. If you have any questions about this medication, please contact your primary care provider. For more information, see [email or web address]/[email or web address]/[email or web address]/[email or web address].pdf    Return to work/school/ guidance:   Please let your workplace manager and  staffing office know when your quarantine ends. We cannot give you an exact date as it depends on the information below. You can calculate this on your own or work with your manager/staffing office to calculate this.    Quarantine Guidelines:  You are considered exposed if you have been within 6 feet of an infected person(s) for 15 minutes or more over a 24-hour period. Quarantine will start after the LAST time you had contact with the infected person while they were contagious (for example, if you saw someone on Monday and Wednesday, your quarantine would start after Wednesday).     If you have NO symptoms (asymptomatic):    Stay home for 14 days (quarantine) after your LAST contact with a person who has COVID-19 (this remains the CDC recommendation for greatest protection against spread of COVID-19), OR    10-day quarantine with no test, OR    Minimum 7-day quarantine with negative RT-PCR test collected on day 5 or later    Quarantine Guideline exceptions:    People who have been fully vaccinated do not need to quarantine unless they have symptoms. You are considered fully vaccinated 2 weeks after your 2nd dose in a 2-dose series or 2 weeks after a single-dose series. This includes vaccinated health care workers.  o Fully vaccinated people should still get tested 5-7 days after exposure, even if they don t have symptoms.   Note: If you have ongoing exposure to the COVID-positive person, this quarantine period may be more than 14 days. For example, if you continue to be exposed to your child who tested positive and cannot isolate from them, then the quarantine of 7-14 days can't start until your child is no longer contagious. This is typically 10 days from onset of the child's symptoms. So, the total duration may be 17-24 days in this case.   Please contact your doctor if you have questions or call the [facility] Public Hotline: [phone] (Mon-Fri: 9am-7pm and Sat: 10am-6pm).     How to Quarantine:     Monitor your  symptoms until 14 days after your last exposure. If you develop signs or symptoms, isolate and get tested (even if you have been tested already).    Stay home and away from others. Don't go to school or anywhere else. Generally, quarantine means staying home from work but there are some exceptions to this. Please contact your workplace. Cover your mouth and nose with a face covering if you must be around other people.     Wash your hands and face often. Use soap and water.    What are the symptoms of COVID-19?  The most common symptoms are cough, fever and trouble breathing. Less common symptoms include headache, body aches, fatigue (feeling very tired), chills, sore throat, stuffy or runny nose, diarrhea (loose poop), loss of taste or smell, belly pain, and nausea or vomiting (feeling sick to your stomach or throwing up).  If you develop symptoms, follow these guidelines:    If you're normally healthy: Please start another eVisit.    If you have a serious health problem (like cancer, heart failure, an organ transplant or kidney disease): Call your specialty clinic. Let them know that you might have COVID-19.    Where can I get more information?    [facility] - About COVID-19: [email or web address]     CDC - What to Do If You're Sick:     www.cdc.gov/coronavirus/2019-ncov/about/steps-when-sick.html    CDC - Ending Home Isolation:  https://www.cdc.gov/coronavirus/2019-ncov/your-health/quarantine-isolation.html    CDC - Caring for Someone:  www.cdc.gov/coronavirus/2019-ncov/if-you-are-sick/care-for-someone.html    [facility] clinical trials (COVID-19 research studies): clinicalaffairs.[facility]/umn-clinical-trials    Below are the COVID-19 hotlines at the [facility] of Health ([facility]). Interpreters are available.  o For health questions: Call 176-635-0616 or 1-979.503.9297 (7 am to 7 pm)  o For questions about schools and childcare: Call 319-518-7846 or 1-688.895.5307 (7 a.m. to 7  "p.m.)    January 10, 2022  RE:  Lavren Rodriguez                                                                                                                   [address] [phone]      To whom it may concern:    I evaluated Lavern Rodriguez on January 10, 2022. Lavern Rodriguez should be excused from work/school.    They should let their workplace manager and staffing office know when their quarantine ends.    We can not give an exact date as it depends on the information below. They can calculate this on their own or work with their manager/staffing office to calculate this. (For example if they were exposed on 10/04, they would have to quarantine for 14 full days. That would be through 10/18. They could return on 10/19.)    Quarantine Guidelines:    Patients (\"contacts\") who have been in close prolonged contact of an infected person(s) (within six feet for at least 15 minutes within a 24 hour period) and remain asymptomatic should enter quarantine based on the following options:      14-day quarantine period (this remains the CDC recommendation for the greatest protection against spread of COVID-19) OR    Minimum 7-day quarantine with negative RT-PCR test collected on day 5 or later OR    10-day quarantine with no test   Quarantine Guideline exceptions are as follows:    People who have been fully vaccinated do not need to quarantine if the exposure was at least 2 weeks after the last vaccination. This includes vaccinated health care workers.    Not fully vaccinated and unvaccinated Individuals who work in health care, congregate care, or congregate living should be off work for 14 days from their last date of exposure. Community activities for this group can be resumed based on options above. Fully vaccinated individuals in this group do not need to quarantine from work after exposure.    Not fully vaccinated and unvaccinated people whose high-risk exposure was a household member should " always quarantine for 14 days from their last date of exposure. Fully vaccinated people in this category do not need to quarantine.    Not fully vaccinated or unvaccinated residents of congregate care and congregate living settings should always quarantine for 14 days from their last date of exposure. Fully vaccinated residents do not need to quarantine.    Note: If there is ongoing exposure to the covid positive person, this quarantine period may be longer than 14 days. (For example, if they are continually exposed to their child, who tested positive and cannot isolate from them, then the quarantine of 7-14 days can't start until their child is no longer contagious. This is typically 10 days from onset to the child's symptoms. So the total duration may be 17-24 days in this case.)    Lavern Rodriguez should continue symptom monitoring until day 14 post-exposure. If they develop signs or symptoms of COVID-19, they should isolate and get tested (even if they have been tested already).    Sincerely,  Justino Trammell, DO

## 2022-01-15 ENCOUNTER — LAB (OUTPATIENT)
Dept: URGENT CARE | Facility: URGENT CARE | Age: 32
End: 2022-01-15
Attending: FAMILY MEDICINE
Payer: COMMERCIAL

## 2022-01-15 DIAGNOSIS — Z20.822 CLOSE EXPOSURE TO 2019 NOVEL CORONAVIRUS: ICD-10-CM

## 2022-01-15 PROCEDURE — U0005 INFEC AGEN DETEC AMPLI PROBE: HCPCS

## 2022-01-15 PROCEDURE — U0003 INFECTIOUS AGENT DETECTION BY NUCLEIC ACID (DNA OR RNA); SEVERE ACUTE RESPIRATORY SYNDROME CORONAVIRUS 2 (SARS-COV-2) (CORONAVIRUS DISEASE [COVID-19]), AMPLIFIED PROBE TECHNIQUE, MAKING USE OF HIGH THROUGHPUT TECHNOLOGIES AS DESCRIBED BY CMS-2020-01-R: HCPCS

## 2022-01-16 LAB — SARS-COV-2 RNA RESP QL NAA+PROBE: NEGATIVE

## 2022-01-18 ENCOUNTER — PRE VISIT (OUTPATIENT)
Dept: NEUROSURGERY | Facility: CLINIC | Age: 32
End: 2022-01-18

## 2022-02-19 ENCOUNTER — HEALTH MAINTENANCE LETTER (OUTPATIENT)
Age: 32
End: 2022-02-19

## 2022-10-03 ENCOUNTER — VIRTUAL VISIT (OUTPATIENT)
Dept: FAMILY MEDICINE | Facility: CLINIC | Age: 32
End: 2022-10-03
Payer: COMMERCIAL

## 2022-10-03 DIAGNOSIS — R73.03 PREDIABETES: Primary | ICD-10-CM

## 2022-10-03 PROBLEM — Z93.3 STATUS POST COLOSTOMY (H): Status: ACTIVE | Noted: 2021-05-15

## 2022-10-03 PROBLEM — F14.91 HISTORY OF COCAINE USE: Status: ACTIVE | Noted: 2020-08-24
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
## 2022-10-03 PROBLEM — Z34.02 ENCOUNTER FOR SUPERVISION OF NORMAL FIRST PREGNANCY IN SECOND TRIMESTER: Status: RESOLVED | Noted: 2020-10-08 | Resolved: 2022-10-03

## 2022-10-03 PROBLEM — S62.232G: Status: ACTIVE | Noted: 2021-10-25

## 2022-10-03 PROBLEM — Z29.13 NEED FOR RHOGAM DUE TO RH NEGATIVE MOTHER: Status: ACTIVE | Noted: 2017-07-20

## 2022-10-03 PROBLEM — Z78.9 NOT IMMUNE TO HEPATITIS B VIRUS: Status: ACTIVE | Noted: 2017-07-20

## 2022-10-03 PROBLEM — O99.019 ANEMIA COMPLICATING PREGNANCY: Status: ACTIVE | Noted: 2018-02-07

## 2022-10-03 PROBLEM — T84.498A: Status: ACTIVE | Noted: 2022-02-28

## 2022-10-03 PROBLEM — Z90.49 STATUS POST SMALL BOWEL RESECTION: Status: ACTIVE | Noted: 2021-05-15

## 2022-10-03 PROBLEM — B00.9 HSV INFECTION: Status: ACTIVE | Noted: 2022-07-25

## 2022-10-03 PROBLEM — S62.311G: Status: ACTIVE | Noted: 2021-08-09

## 2022-10-03 PROBLEM — Z86.19 HISTORY OF GONORRHEA: Status: ACTIVE | Noted: 2020-08-24

## 2022-10-03 PROCEDURE — 99203 OFFICE O/P NEW LOW 30 MIN: CPT | Mod: 95 | Performed by: PHYSICIAN ASSISTANT

## 2022-10-03 NOTE — PROGRESS NOTES
Lavern is a 32 year old who is being evaluated via a billable video visit.      How would you like to obtain your AVS? MyChart  If the video visit is dropped, the invitation should be resent by: Text to cell phone: [phone]  Will anyone else be joining your video visit? No          Assessment & Plan     Prediabetes  Labs to be updated with lab only appointment; option to see nutritionist with referral updated today; continue healthy diet and exercise with no need for medicine at this time.  - Hemoglobin A1c; Future  - Nutrition Referral; Future    Review of prior external note(s) from - previous routine PCP notes  35 minutes spent on the date of the encounter doing chart review, history and exam, documentation and further activities per the note       Nicotine/Tobacco Cessation:  She reports that she has been smoking cigarettes. She started smoking about 11 years ago. She has a 2.00 pack-year smoking history. She has never used smokeless tobacco.  Nicotine/Tobacco Cessation Plan:   Information offered: Patient not interested at this time      Patient Instructions   Did you know?      You can schedule a video visit for follow-up appointments as well as future appointments for certain conditions.  Please see the below link.     Video Visits ([email or web address])     If you have not already done so,  I encourage you to sign up for 8x8 Inchart ([email or web address]).  This will allow you to review your results, securely communicate with a provider, and schedule virtual visits as well.      No follow-ups on file.    Latesha May PA-C  [facility]   Lavern is a 32 year old presenting for the following health issues:  Diabetes      HPI     Labs updated 9/1/22 with HgbA1C of 5.9, indicative of prediabetes  Patient would like to recheck to be safe; patient has been trying to eat healthy.  She's been trying some VitD, apple cider vinegar, cinnamon  supplements.  She will use protein powders in the am and benefiber supplements.  Patient has been checking glucose readings at home and they range 90 to low 100s.    Review of Systems   Constitutional, HEENT, cardiovascular, pulmonary, GI, , musculoskeletal, neuro, skin, endocrine and psych systems are negative, except as otherwise noted.      Objective           Vitals:  No vitals were obtained today due to virtual visit.    Physical Exam   GENERAL: Healthy, alert and no distress  EYES: Eyes grossly normal to inspection.  No discharge or erythema, or obvious scleral/conjunctival abnormalities.  RESP: No audible wheeze, cough, or visible cyanosis.  No visible retractions or increased work of breathing.    SKIN: Visible skin clear. No significant rash, abnormal pigmentation or lesions.  NEURO: Cranial nerves grossly intact.  Mentation and speech appropriate for age.  PSYCH: Mentation appears normal, affect normal/bright, judgement and insight intact, normal speech and appearance well-groomed.              Video-Visit Details    Video Start Time: 1:42 PM    Type of service:  Video Visit    Video End Time:2:09 PM    Originating Location (pt. Location): Home    Distant Location (provider location):  [facility]     Platform used for Video Visit: [employer]

## 2022-10-03 NOTE — PATIENT INSTRUCTIONS
Did you know?      You can schedule a video visit for follow-up appointments as well as future appointments for certain conditions.  Please see the below link.     Video Visits ([email or web address])     If you have not already done so,  I encourage you to sign up for [email or web address] ([email or web address]).  This will allow you to review your results, securely communicate with a provider, and schedule virtual visits as well.

## 2022-10-04 ENCOUNTER — TELEPHONE (OUTPATIENT)
Dept: FAMILY MEDICINE | Facility: CLINIC | Age: 32
End: 2022-10-04

## 2022-10-04 ENCOUNTER — MYC MEDICAL ADVICE (OUTPATIENT)
Dept: FAMILY MEDICINE | Facility: CLINIC | Age: 32
End: 2022-10-04

## 2022-10-04 ENCOUNTER — LAB (OUTPATIENT)
Dept: LAB | Facility: CLINIC | Age: 32
End: 2022-10-04
Payer: COMMERCIAL

## 2022-10-04 DIAGNOSIS — R73.03 PREDIABETES: Primary | ICD-10-CM

## 2022-10-04 DIAGNOSIS — R73.03 PREDIABETES: ICD-10-CM

## 2022-10-04 LAB — HBA1C MFR BLD: 5.6 % (ref 0–5.6)

## 2022-10-04 PROCEDURE — 36415 COLL VENOUS BLD VENIPUNCTURE: CPT

## 2022-10-04 PROCEDURE — 83036 HEMOGLOBIN GLYCOSYLATED A1C: CPT

## 2022-10-04 NOTE — TELEPHONE ENCOUNTER
Test Results        Who ordered the test:  Plosser- patient wanting to speaking only to you regarding results from test yesterday 10/3/22    Type of test: Lab    Date of test:  10/3/22    Where was the test performed:      What are your questions/concerns?:      Could we send this information to you in [employer] or would you prefer to receive a phone call?:   No preference   Okay to leave a detailed message?: Yes at Home number on file [phone] (home) or Other phone number:

## 2022-10-04 NOTE — RESULT ENCOUNTER NOTE
"Shakila Puckett  Your attached labs are back in the \"normal\" non-prediabetic range... keep up the good work!!  Please contact the office with any questions or concerns.    Latesha Yates \"He\" MITCHELL May    "

## 2022-10-04 NOTE — TELEPHONE ENCOUNTER
MP,  Please see below [employer] message and advise.  Results from today 5.6, no follow-up after this noted.  Thanks,  Berenice WILLS RN

## 2022-10-04 NOTE — TELEPHONE ENCOUNTER
"We can repeat labs in 3-4 months, will place order now.  Looks like she left a telephone message for me to call her too, let's see if we can take care of this/any further questions via [employer].    Thanks  Latesha \"He\" MITCHELL May   "

## 2022-10-16 ENCOUNTER — HEALTH MAINTENANCE LETTER (OUTPATIENT)
Age: 32
End: 2022-10-16

## 2022-10-30 ENCOUNTER — MYC MEDICAL ADVICE (OUTPATIENT)
Dept: FAMILY MEDICINE | Facility: CLINIC | Age: 32
End: 2022-10-30

## 2022-10-31 NOTE — TELEPHONE ENCOUNTER
"I've seen patient recently, ok to do form, mind getting one from the files and putting it on my desk?  Thanks  Latesha \"He\" MITCHELL May   "

## 2022-11-01 NOTE — TELEPHONE ENCOUNTER
Voice mail left for patient to let us know if we need to mail form or if patient is going to .    Thanks  Ani LOZADA

## 2022-11-01 NOTE — TELEPHONE ENCOUNTER
Placed Disability Parking form on your Plosser's  desk to complete. When complete will call patient to see how to return it as there are parts for patient to complete  [facility] Unit Coordinator

## 2022-11-02 ENCOUNTER — MYC MEDICAL ADVICE (OUTPATIENT)
Dept: EDUCATION SERVICES | Facility: OTHER | Age: 32
End: 2022-11-02

## 2022-11-02 NOTE — CONFIDENTIAL NOTE
Nadir response:  Eugene Puckett,    I'm sorry we were not available to speak with you while you were at the store today. We really need to have an appointment with you in order to discuss the food choices further. I know our  is sending you some information on pre-diabetes today so you can get started reviewing that information.    Raisa Hansen RN, [facility]

## 2022-11-04 ENCOUNTER — VIRTUAL VISIT (OUTPATIENT)
Dept: EDUCATION SERVICES | Facility: CLINIC | Age: 32
End: 2022-11-04
Attending: PHYSICIAN ASSISTANT
Payer: COMMERCIAL

## 2022-11-04 DIAGNOSIS — R73.03 PREDIABETES: Primary | ICD-10-CM

## 2022-11-04 PROCEDURE — 97802 MEDICAL NUTRITION INDIV IN: CPT | Mod: 93 | Performed by: DIETITIAN, REGISTERED
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
## 2022-11-11 ENCOUNTER — MYC MEDICAL ADVICE (OUTPATIENT)
Dept: EDUCATION SERVICES | Facility: CLINIC | Age: 32
End: 2022-11-11
Payer: COMMERCIAL

## 2022-11-11 DIAGNOSIS — R73.03 PREDIABETES: Primary | ICD-10-CM

## 2022-11-11 PROCEDURE — 99207 PR NONPHYSICIAN TELEPHONE ASSESSMENT 21-30 MIN: CPT | Performed by: DIETITIAN, REGISTERED

## 2022-11-11 NOTE — TELEPHONE ENCOUNTER
Called patient back. She is having a hard time understanding her blood sugar fluctuations.  She is stressed because she does not want diabetes and gets worried when her blood sugars are higher than 120-140.    Date Breakfast  Lunch  Dinner     Before After Before After Before After   11/7      airfried chicken, brussles sprouts, rice, broccoli   11/8 125 Steel cut oats, pb, primere protein  92   88 122   11/9 87 120 92 131 102 [place], mixed veg, rice  127   11/10 107 131 In court for cousin, ate 3-4 small bags of chips  112 144 102 Hot dogs, macaroni, vegetables  135     11/11 107 101  Wheat thins with ham and cheese  114       Discussed patients concerns.  Provided education regarding liver glucose output in times of fasting OR stress/adrenaline.  Discussed taking a break from testing if she has previously tested the before and after of a meal.  Discussed why blood sugars can rise at night.  Discouraged her from planning cheat meals, rather plan in enjoyable meals, snacks, or desserts in smaller portions.  Patient requested to connect [facility] to team, will send [employer] information.     Yolanda Be MS, RD, LD, CDE  Total time: 23 minutes

## 2022-11-13 ENCOUNTER — MYC MEDICAL ADVICE (OUTPATIENT)
Dept: FAMILY MEDICINE | Facility: CLINIC | Age: 32
End: 2022-11-13

## 2022-11-14 ENCOUNTER — MYC MEDICAL ADVICE (OUTPATIENT)
Dept: EDUCATION SERVICES | Facility: CLINIC | Age: 32
End: 2022-11-14

## 2022-11-14 DIAGNOSIS — R73.03 PREDIABETES: Primary | ICD-10-CM

## 2022-11-14 RX ORDER — METFORMIN HCL 500 MG
1000 TABLET, EXTENDED RELEASE 24 HR ORAL
Qty: 60 TABLET | Refills: 6 | Status: SHIPPED | OUTPATIENT
Start: 2022-11-14 | End: 2023-01-19

## 2022-11-14 NOTE — TELEPHONE ENCOUNTER
Patient with A1C 5.6% referred for pre-diabetes nutrition care.   Virtual visit was completed.    She does not want to get diabetes, is motivated to avoid dx.  Checks BG multiple times daily (not a standard of care without T2D so paying out of pocket).  ~ she has messaged with her BG numbers - fasting BG is elevated as expected with pre-DB    With her very high concern and high fasting BG, it may be beneficial to start Metformin XR to delay progression of pre-diabetes.  ~ consider start at 1000 mg/day Meformin XR    GFR's have been WNL when last checked < 1 year ago    Marci Cordero RD, LD, [facility]

## 2022-11-14 NOTE — TELEPHONE ENCOUNTER
MP,  Please see below MyChart message and advise.  Not sure if you know what she's referring to?  Thanks,  Berenice WILLS RN
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
## 2022-11-24 ENCOUNTER — MYC MEDICAL ADVICE (OUTPATIENT)
Dept: EDUCATION SERVICES | Facility: CLINIC | Age: 32
End: 2022-11-24
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
## 2022-12-01 ENCOUNTER — TELEPHONE (OUTPATIENT)
Dept: EDUCATION SERVICES | Facility: CLINIC | Age: 32
End: 2022-12-01

## 2022-12-01 NOTE — TELEPHONE ENCOUNTER
Created telephone encounter from 12/1/22 since patient requested a call.    Tammy Rodriguez RDN, LD, [facility]
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
## 2022-12-19 ENCOUNTER — MYC MEDICAL ADVICE (OUTPATIENT)
Dept: FAMILY MEDICINE | Facility: CLINIC | Age: 32
End: 2022-12-19

## 2022-12-27 ENCOUNTER — LAB (OUTPATIENT)
Dept: LAB | Facility: CLINIC | Age: 32
End: 2022-12-27
Payer: MEDICAID

## 2022-12-27 ENCOUNTER — MYC MEDICAL ADVICE (OUTPATIENT)
Dept: EDUCATION SERVICES | Facility: CLINIC | Age: 32
End: 2022-12-27

## 2022-12-27 DIAGNOSIS — R73.03 PREDIABETES: ICD-10-CM

## 2022-12-27 LAB — HBA1C MFR BLD: 5.5 % (ref 0–5.6)

## 2022-12-27 PROCEDURE — 36415 COLL VENOUS BLD VENIPUNCTURE: CPT

## 2022-12-27 PROCEDURE — 83036 HEMOGLOBIN GLYCOSYLATED A1C: CPT

## 2023-01-02 NOTE — TELEPHONE ENCOUNTER
Called Lavern back per her request, she is concerned about A1C.    Note A1C 9/1/22 was 5.9% = pre-diabetes range  Now 5.4%, she has made improvements!    Got voice mail (which confirmed correct number)and left a detailed message for her:  Cara calling from [place], I see you had a question about your A1C.  Back in September it was 5.9%, in the pre-diabetes range.  Now it is half a number lower at 5.4%! That is below the pre-diabetes range!  That means you have moved away from pre-diabetes and type 2 diabetes! It does not mean it disappeared and you should be unhealthy, it means your body likes and is responding well to the healthy things you are doing.   Short story, keep up the healthy eating and activity as you can! No reason for any changes or concerns.   Doctor will probably check that lab again in 3 or 6 months, so just keep up your healthy habits.     Marci Cordero RD, LD, [facility]

## 2023-01-16 ENCOUNTER — MYC MEDICAL ADVICE (OUTPATIENT)
Dept: FAMILY MEDICINE | Facility: CLINIC | Age: 33
End: 2023-01-16
Payer: COMMERCIAL

## 2023-01-17 NOTE — TELEPHONE ENCOUNTER
"Offer virtual visit at least or can follow up with ob/gyne if preferred.    Thanks  Latesha \"He\" MITCHELL May   "
MP,  Please see below [place] message and advise.  Thanks,  Berenice WILLS RN    
No prior provider

## 2023-01-19 ENCOUNTER — VIRTUAL VISIT (OUTPATIENT)
Dept: FAMILY MEDICINE | Facility: CLINIC | Age: 33
End: 2023-01-19
Payer: COMMERCIAL

## 2023-01-19 ENCOUNTER — DOCUMENTATION ONLY (OUTPATIENT)
Dept: LAB | Facility: CLINIC | Age: 33
End: 2023-01-19

## 2023-01-19 DIAGNOSIS — E07.9 DISORDER OF THYROID: ICD-10-CM

## 2023-01-19 DIAGNOSIS — R53.83 FATIGUE, UNSPECIFIED TYPE: ICD-10-CM

## 2023-01-19 DIAGNOSIS — N94.9 VAGINAL SYMPTOM: ICD-10-CM

## 2023-01-19 DIAGNOSIS — Z13.21 ENCOUNTER FOR VITAMIN DEFICIENCY SCREENING: ICD-10-CM

## 2023-01-19 DIAGNOSIS — R73.03 PREDIABETES: Primary | ICD-10-CM

## 2023-01-19 DIAGNOSIS — Z13.0 SCREENING, ANEMIA, DEFICIENCY, IRON: ICD-10-CM

## 2023-01-19 PROBLEM — M67.432 GANGLION OF LEFT WRIST: Status: ACTIVE | Noted: 2022-11-01

## 2023-01-19 PROCEDURE — 99213 OFFICE O/P EST LOW 20 MIN: CPT | Mod: 95 | Performed by: PHYSICIAN ASSISTANT

## 2023-01-19 RX ORDER — FLUCONAZOLE 150 MG/1
150 TABLET ORAL ONCE
Qty: 1 TABLET | Refills: 0 | Status: SHIPPED | OUTPATIENT
Start: 2023-01-19 | End: 2023-01-19

## 2023-01-19 RX ORDER — METRONIDAZOLE 7.5 MG/G
1 GEL VAGINAL DAILY
Qty: 70 G | Refills: 0 | Status: SHIPPED | OUTPATIENT
Start: 2023-01-19 | End: 2023-01-23

## 2023-01-19 NOTE — PROGRESS NOTES
Lavern is a 32 year old who is being evaluated via a billable video visit.      How would you like to obtain your AVS? MyChart  If the video visit is dropped, the invitation should be resent by: Text to cell phone: [phone]  Will anyone else be joining your video visit? No        Assessment & Plan     Prediabetes  Recheck labs with morning/am draw if possible.  - Hemoglobin A1c; Future  - Comprehensive metabolic panel; Future  - CBC with Platelets & Differential; Future  - C-peptide; Future    Disorder of thyroid  Recheck labs with lab only appt  - TSH with free T4 reflex; Future    Vaginal symptom  presumptively treat for BACTERIAL VAGINOSIS followed by yeast given patient's recurrent history.  - Wet prep - lab collect; Future  - metroNIDAZOLE (METROGEL) 0.75 % vaginal gel; Place 1 applicator (5 g) vaginally daily for 7 days  - fluconazole (DIFLUCAN) 150 MG tablet; Take 1 tablet (150 mg) by mouth once for 1 dose    Fatigue, unspecified type  Unclear etiology but will check morning labs with lab only appointment; consider sleep study as next step pending above. Return to clinic with any worsening or changes in symptoms and follow up with PCP for routine care.   - CRP inflammation; Future  - Erythrocyte sedimentation rate auto; Future  - Anti Nuclear Magaly IgG by IFA with Reflex; Future  - Cortisol; Future  - Testosterone Free and Total; Future    Encounter for vitamin deficiency screening  - Vitamin B12; Future  - Vitamin D Deficiency; Future  - Magnesium; Future    Screening, anemia, deficiency, iron  - Ferritin; Future  - Iron & Iron Binding Capacity; Future    Review of prior external note(s) from - previous PCP and routine notes  20 minutes spent on the date of the encounter doing chart review, history and exam, documentation and further activities per the note       There are no Patient Instructions on file for this visit.    Return in about 6 months (around 7/19/2023) for Follow up, Routine Visit, or  sooner with worsening symptoms.    MITCHELL [facility]    Shivani Puckett is a 32 year old presenting for the following health issues:  Fatigue and Vaginal Problem      History of Present Illness       Reason for visit:  Testing    She eats 0-1 servings of fruits and vegetables daily.She consumes 2 sweetened beverage(s) daily.She exercises with enough effort to increase her heart rate 9 or less minutes per day.  She exercises with enough effort to increase her heart rate 3 or less days per week. She is missing 7 dose(s) of medications per week.  She is not taking prescribed medications regularly due to remembering to take and other.       Patient wants to repeat insulin resistance/diabetes testing and further testing for increased fatigue. Issues for some time but wants to be sure not missing anything.  Working on diet and exercise as well.      Review of Systems   Constitutional, HEENT, cardiovascular, pulmonary, GI, , musculoskeletal, neuro, skin, endocrine and psych systems are negative, except as otherwise noted.      Objective           Vitals:  No vitals were obtained today due to virtual visit.    Physical Exam   GENERAL: Healthy, alert and no distress  EYES: Eyes grossly normal to inspection.  No discharge or erythema, or obvious scleral/conjunctival abnormalities.  RESP: No audible wheeze, cough, or visible cyanosis.  No visible retractions or increased work of breathing.    SKIN: Visible skin clear. No significant rash, abnormal pigmentation or lesions.  NEURO: Cranial nerves grossly intact.  Mentation and speech appropriate for age.  PSYCH: Mentation appears normal, affect normal/bright, judgement and insight intact, normal speech and appearance well-groomed.            Video-Visit Details    Type of service:  Video Visit   Video Start Time: 11:40 AM  Video End Time:11:49 AM    Originating Location (pt. Location): Home    Distant Location (provider  location):  On-site  Platform used for Video Visit: [employer]

## 2023-01-20 ENCOUNTER — LAB (OUTPATIENT)
Dept: LAB | Facility: CLINIC | Age: 33
End: 2023-01-20
Payer: COMMERCIAL

## 2023-01-20 DIAGNOSIS — E07.9 DISORDER OF THYROID: ICD-10-CM

## 2023-01-20 DIAGNOSIS — R53.83 FATIGUE, UNSPECIFIED TYPE: ICD-10-CM

## 2023-01-20 DIAGNOSIS — Z13.21 ENCOUNTER FOR VITAMIN DEFICIENCY SCREENING: ICD-10-CM

## 2023-01-20 DIAGNOSIS — N94.9 VAGINAL SYMPTOM: ICD-10-CM

## 2023-01-20 DIAGNOSIS — R73.03 PREDIABETES: ICD-10-CM

## 2023-01-20 DIAGNOSIS — Z13.0 SCREENING, ANEMIA, DEFICIENCY, IRON: ICD-10-CM

## 2023-01-20 LAB
ALBUMIN SERPL BCG-MCNC: 4.1 G/DL (ref 3.5–5.2)
ALP SERPL-CCNC: 79 U/L (ref 35–104)
ALT SERPL W P-5'-P-CCNC: 18 U/L (ref 10–35)
ANION GAP SERPL CALCULATED.3IONS-SCNC: 14 MMOL/L (ref 7–15)
AST SERPL W P-5'-P-CCNC: 24 U/L (ref 10–35)
BASOPHILS # BLD AUTO: 0 10E3/UL (ref 0–0.2)
BASOPHILS NFR BLD AUTO: 0 %
BILIRUB SERPL-MCNC: 0.3 MG/DL
BUN SERPL-MCNC: 16.5 MG/DL (ref 6–20)
CALCIUM SERPL-MCNC: 9 MG/DL (ref 8.6–10)
CHLORIDE SERPL-SCNC: 108 MMOL/L (ref 98–107)
CLUE CELLS: PRESENT
CORTIS SERPL-MCNC: 11.6 UG/DL
CREAT SERPL-MCNC: 0.67 MG/DL (ref 0.51–0.95)
CRP SERPL-MCNC: 4.81 MG/L
DEPRECATED CALCIDIOL+CALCIFEROL SERPL-MC: 34 UG/L (ref 20–75)
DEPRECATED HCO3 PLAS-SCNC: 19 MMOL/L (ref 22–29)
EOSINOPHIL # BLD AUTO: 0 10E3/UL (ref 0–0.7)
EOSINOPHIL NFR BLD AUTO: 1 %
ERYTHROCYTE [DISTWIDTH] IN BLOOD BY AUTOMATED COUNT: 13.2 % (ref 10–15)
ERYTHROCYTE [SEDIMENTATION RATE] IN BLOOD BY WESTERGREN METHOD: 28 MM/HR (ref 0–20)
FERRITIN SERPL-MCNC: 14 NG/ML (ref 6–175)
GFR SERPL CREATININE-BSD FRML MDRD: >90 ML/MIN/1.73M2
GLUCOSE SERPL-MCNC: 96 MG/DL (ref 70–99)
HBA1C MFR BLD: 5.6 % (ref 0–5.6)
HCT VFR BLD AUTO: 34.7 % (ref 35–47)
HGB BLD-MCNC: 11.5 G/DL (ref 11.7–15.7)
IMM GRANULOCYTES # BLD: 0 10E3/UL
IMM GRANULOCYTES NFR BLD: 0 %
IRON BINDING CAPACITY (ROCHE): 374 UG/DL (ref 240–430)
IRON SATN MFR SERPL: 11 % (ref 15–46)
IRON SERPL-MCNC: 43 UG/DL (ref 37–145)
LYMPHOCYTES # BLD AUTO: 1.4 10E3/UL (ref 0.8–5.3)
LYMPHOCYTES NFR BLD AUTO: 43 %
MAGNESIUM SERPL-MCNC: 2 MG/DL (ref 1.7–2.3)
MCH RBC QN AUTO: 28 PG (ref 26.5–33)
MCHC RBC AUTO-ENTMCNC: 33.1 G/DL (ref 31.5–36.5)
MCV RBC AUTO: 85 FL (ref 78–100)
MONOCYTES # BLD AUTO: 0.2 10E3/UL (ref 0–1.3)
MONOCYTES NFR BLD AUTO: 7 %
NEUTROPHILS # BLD AUTO: 1.7 10E3/UL (ref 1.6–8.3)
NEUTROPHILS NFR BLD AUTO: 49 %
PLATELET # BLD AUTO: 274 10E3/UL (ref 150–450)
POTASSIUM SERPL-SCNC: 4 MMOL/L (ref 3.4–5.3)
PROT SERPL-MCNC: 6.9 G/DL (ref 6.4–8.3)
RBC # BLD AUTO: 4.1 10E6/UL (ref 3.8–5.2)
SODIUM SERPL-SCNC: 141 MMOL/L (ref 136–145)
TRICHOMONAS, WET PREP: ABNORMAL
TSH SERPL DL<=0.005 MIU/L-ACNC: 0.62 UIU/ML (ref 0.3–4.2)
VIT B12 SERPL-MCNC: 736 PG/ML (ref 232–1245)
WBC # BLD AUTO: 3.4 10E3/UL (ref 4–11)
WBC'S/HIGH POWER FIELD, WET PREP: ABNORMAL
YEAST, WET PREP: ABNORMAL

## 2023-01-20 PROCEDURE — 86140 C-REACTIVE PROTEIN: CPT

## 2023-01-20 PROCEDURE — 82306 VITAMIN D 25 HYDROXY: CPT

## 2023-01-20 PROCEDURE — 86038 ANTINUCLEAR ANTIBODIES: CPT

## 2023-01-20 PROCEDURE — 84270 ASSAY OF SEX HORMONE GLOBUL: CPT

## 2023-01-20 PROCEDURE — 83550 IRON BINDING TEST: CPT

## 2023-01-20 PROCEDURE — 84681 ASSAY OF C-PEPTIDE: CPT

## 2023-01-20 PROCEDURE — 36415 COLL VENOUS BLD VENIPUNCTURE: CPT

## 2023-01-20 PROCEDURE — 83036 HEMOGLOBIN GLYCOSYLATED A1C: CPT

## 2023-01-20 PROCEDURE — 84403 ASSAY OF TOTAL TESTOSTERONE: CPT

## 2023-01-20 PROCEDURE — 82607 VITAMIN B-12: CPT

## 2023-01-20 PROCEDURE — 83735 ASSAY OF MAGNESIUM: CPT

## 2023-01-20 PROCEDURE — 85652 RBC SED RATE AUTOMATED: CPT

## 2023-01-20 PROCEDURE — 82728 ASSAY OF FERRITIN: CPT

## 2023-01-20 PROCEDURE — 87210 SMEAR WET MOUNT SALINE/INK: CPT

## 2023-01-20 PROCEDURE — 80050 GENERAL HEALTH PANEL: CPT

## 2023-01-20 PROCEDURE — 83540 ASSAY OF IRON: CPT

## 2023-01-20 PROCEDURE — 82533 TOTAL CORTISOL: CPT

## 2023-01-21 LAB — SHBG SERPL-SCNC: 49 NMOL/L (ref 30–135)

## 2023-01-23 ENCOUNTER — VIRTUAL VISIT (OUTPATIENT)
Dept: FAMILY MEDICINE | Facility: CLINIC | Age: 33
End: 2023-01-23
Payer: COMMERCIAL

## 2023-01-23 DIAGNOSIS — R53.83 FATIGUE, UNSPECIFIED TYPE: ICD-10-CM

## 2023-01-23 DIAGNOSIS — R73.03 PREDIABETES: Primary | ICD-10-CM

## 2023-01-23 LAB — C PEPTIDE SERPL-MCNC: 1.3 NG/ML (ref 0.9–6.9)

## 2023-01-23 PROCEDURE — 99213 OFFICE O/P EST LOW 20 MIN: CPT | Mod: 95 | Performed by: PHYSICIAN ASSISTANT

## 2023-01-23 RX ORDER — BACILLUS COAGULANS 1B CELL
1 CAPSULE ORAL DAILY
Qty: 90 TABLET | Refills: 1 | Status: SHIPPED | OUTPATIENT
Start: 2023-01-23

## 2023-01-23 ASSESSMENT — PATIENT HEALTH QUESTIONNAIRE - PHQ9
SUM OF ALL RESPONSES TO PHQ QUESTIONS 1-9: 8
SUM OF ALL RESPONSES TO PHQ QUESTIONS 1-9: 8
10. IF YOU CHECKED OFF ANY PROBLEMS, HOW DIFFICULT HAVE THESE PROBLEMS MADE IT FOR YOU TO DO YOUR WORK, TAKE CARE OF THINGS AT HOME, OR GET ALONG WITH OTHER PEOPLE: EXTREMELY DIFFICULT

## 2023-01-23 NOTE — PROGRESS NOTES
Lavern is a 32 year old who is being evaluated via a billable video visit.      How would you like to obtain your AVS? MyChart  If the video visit is dropped, the invitation should be resent by: Text to cell phone: [phone]  Will anyone else be joining your video visit? No      Assessment & Plan     Prediabetes  Fatigue, unspecified type  Long standing, chronic, with no specific bloodwork abnormalities correlated with exception of possible low iron levels - history of iron supplements making her stomach upset but will try VitronC; if still not tolerated/numbers don't improve over the next 2-3 months, consider iron infusion, pending repeat lab results. Return to clinic with any worsening or changes in symptoms and follow up  for routine care.   - ferrous fumarate 65 mg, [place]. FE,-Vitamin C 125 mg (VITRON-C)  MG TABS tablet; Take 1 tablet by mouth daily  - Ferritin; Future  - CBC with Platelets & Differential; Future  - TSH with free T4 reflex; Future    Review of prior external note(s) from - previous routine notes  20 minutes spent on the date of the encounter doing chart review, history and exam, documentation and further activities per the note       There are no Patient Instructions on file for this visit.    Return in about 6 months (around 7/23/2023) for Follow up, Routine Visit, or sooner with worsening symptoms.    Latesha May PA-C  [facility]   Lavern is a 32 year old presenting for the following health issues:  Results      HPI     Pt would like to discuss lab work from 01/20/2023  Patient using a glucose monitor at home and her sugars read 280.  Patient always feels fatigued.      Review of Systems   Constitutional, HEENT, cardiovascular, pulmonary, GI, , musculoskeletal, neuro, skin, endocrine and psych systems are negative, except as otherwise noted.      Objective    Vitals - Patient Reported  Weight (Patient Reported): 79.4 kg (175  "lb)  Height (Patient Reported): 165.1 cm (5' 5\")  BMI (Based on Pt Reported Ht/Wt): 29.12        Physical Exam   GENERAL: Healthy, alert and no distress  EYES: Eyes grossly normal to inspection.  No discharge or erythema, or obvious scleral/conjunctival abnormalities.  RESP: No audible wheeze, cough, or visible cyanosis.  No visible retractions or increased work of breathing.    SKIN: Visible skin clear. No significant rash, abnormal pigmentation or lesions.  NEURO: Cranial nerves grossly intact.  Mentation and speech appropriate for age.  PSYCH: Mentation appears normal, affect normal/bright, judgement and insight intact, normal speech and appearance well-groomed.    No results found for any visits on 01/23/23.          Video-Visit Details    Type of service:  Video Visit   Video Start Time: 2:15 PM  Video End Time:2:36 PM    Originating Location (pt. Location): Home    Distant Location (provider location):  Off-site  Platform used for Video Visit: VigLink    Answers for HPI/ROS submitted by the patient on 1/23/2023  If you checked off any problems, how difficult have these problems made it for you to do your work, take care of things at home, or get along with other people?: Extremely difficult  PHQ9 TOTAL SCORE: 8      "

## 2023-01-24 LAB — ANA SER QL IF: NEGATIVE

## 2023-01-25 LAB
TESTOST FREE SERPL-MCNC: 0.26 NG/DL
TESTOST SERPL-MCNC: 19 NG/DL (ref 8–60)

## 2023-01-27 ENCOUNTER — MYC MEDICAL ADVICE (OUTPATIENT)
Dept: FAMILY MEDICINE | Facility: CLINIC | Age: 33
End: 2023-01-27
Payer: COMMERCIAL

## 2023-02-03 ENCOUNTER — MYC MEDICAL ADVICE (OUTPATIENT)
Dept: FAMILY MEDICINE | Facility: CLINIC | Age: 33
End: 2023-02-03
Payer: COMMERCIAL

## 2023-02-06 ENCOUNTER — TELEPHONE (OUTPATIENT)
Dept: FAMILY MEDICINE | Facility: CLINIC | Age: 33
End: 2023-02-06
Payer: COMMERCIAL

## 2023-02-06 NOTE — TELEPHONE ENCOUNTER
"We just received the Fax Forms \"[employer] third party verification forms for her 3 Children.... on [place] side they are regarding her 3 children % Patient will give to Yadira  Called patient let her know we Just Received the Paper work and that we will give to Yadira and let her know if she will complete or not due to the children are Not patient's of Yadira or if Yadira would want a VV To Complete   Placed on aYdira's desk and will let Patient know    Lluvia [facility] Unit Coordinator        "

## 2023-02-06 NOTE — TELEPHONE ENCOUNTER
Picture blurry.  Asked patient to resend.  See [employer] message.    Ani Soto RN    
The patient is a 64y Female complaining of

## 2023-02-06 NOTE — TELEPHONE ENCOUNTER
Patient calling to see if clinic received paperwork from Katie at [facility]. Forms are regarding need for separate rooms for her children  Was provided FAX: [phone]    No notes or TE in chart regarding request on Friday. Patient became very upset and stats is sick of being transferred around to people who get paid to not do their jobs and wants to speak directing to ED May.   She needs paperwork today  Also sent multiple mychart msg regarding different subject      Anna PALUMBO RN  [facility]

## 2023-02-07 NOTE — TELEPHONE ENCOUNTER
Forms faxed to [facility] fax # [phone] and copy sent to stat scan.     Notified patient forms were faxed.    Smita LOZADA

## 2023-02-13 NOTE — TELEPHONE ENCOUNTER
Spoke with mom.   Found forms.   Emailed to mom - email noted in patient's chart.     Smita LOZADA

## 2023-02-13 NOTE — TELEPHONE ENCOUNTER
TC,      Patient called.  Asking if form can be attached to [place].  Can we do this?    Patient screaming in the phone stating she needs form ASAP    States [facility] has not received form.    Ani Soto RN

## 2023-02-21 ENCOUNTER — E-VISIT (OUTPATIENT)
Dept: FAMILY MEDICINE | Facility: CLINIC | Age: 33
End: 2023-02-21
Payer: COMMERCIAL

## 2023-02-21 DIAGNOSIS — K12.0 APHTHOUS ULCER: Primary | ICD-10-CM

## 2023-02-21 PROCEDURE — 99421 OL DIG E/M SVC 5-10 MIN: CPT | Performed by: PHYSICIAN ASSISTANT

## 2023-02-21 RX ORDER — DEXAMETHASONE 0.5 MG/5ML
ELIXIR ORAL
Qty: 237 ML | Refills: 0 | Status: SHIPPED | OUTPATIENT
Start: 2023-02-21 | End: 2023-04-04

## 2023-02-22 ENCOUNTER — TELEPHONE (OUTPATIENT)
Dept: FAMILY MEDICINE | Facility: CLINIC | Age: 33
End: 2023-02-22
Payer: COMMERCIAL

## 2023-02-22 DIAGNOSIS — K12.0 CANKER SORE: Primary | ICD-10-CM

## 2023-02-22 RX ORDER — LIDOCAINE HYDROCHLORIDE 20 MG/ML
5 SOLUTION OROPHARYNGEAL
Qty: 45 ML | Refills: 0 | Status: SHIPPED | OUTPATIENT
Start: 2023-02-22 | End: 2023-04-04

## 2023-02-22 RX ORDER — CHLORHEXIDINE GLUCONATE ORAL RINSE 1.2 MG/ML
15 SOLUTION DENTAL 2 TIMES DAILY
Qty: 15 ML | Refills: 0 | Status: SHIPPED | OUTPATIENT
Start: 2023-02-22 | End: 2023-04-04

## 2023-02-22 NOTE — TELEPHONE ENCOUNTER
SLynN. (DOD),   Patient calling  States aphthous ulcers burn with decadron treatment  Requesting lidocaine and chlorhexidine   Pended if you approve  Darlyn SPENCER RN

## 2023-03-03 ENCOUNTER — E-VISIT (OUTPATIENT)
Dept: FAMILY MEDICINE | Facility: CLINIC | Age: 33
End: 2023-03-03

## 2023-03-03 ENCOUNTER — E-VISIT (OUTPATIENT)
Dept: FAMILY MEDICINE | Facility: CLINIC | Age: 33
End: 2023-03-03
Payer: COMMERCIAL

## 2023-03-03 DIAGNOSIS — N94.9 VAGINAL SYMPTOM: Primary | ICD-10-CM

## 2023-03-03 DIAGNOSIS — H10.32 ACUTE BACTERIAL CONJUNCTIVITIS OF LEFT EYE: ICD-10-CM

## 2023-03-03 DIAGNOSIS — H10.32 ACUTE BACTERIAL CONJUNCTIVITIS OF LEFT EYE: Primary | ICD-10-CM

## 2023-03-03 PROCEDURE — 99207 PR NO CHARGE LOS: CPT | Performed by: PHYSICIAN ASSISTANT

## 2023-03-03 RX ORDER — FLUCONAZOLE 150 MG/1
150 TABLET ORAL
Qty: 3 TABLET | Refills: 0 | Status: SHIPPED | OUTPATIENT
Start: 2023-03-03 | End: 2023-03-10

## 2023-03-03 RX ORDER — CIPROFLOXACIN HYDROCHLORIDE 3.5 MG/ML
SOLUTION/ DROPS TOPICAL
Qty: 5 ML | Refills: 0 | Status: SHIPPED | OUTPATIENT
Start: 2023-03-03 | End: 2023-03-10

## 2023-03-03 RX ORDER — METRONIDAZOLE 500 MG/1
500 TABLET ORAL 2 TIMES DAILY
Qty: 14 TABLET | Refills: 0 | Status: SHIPPED | OUTPATIENT
Start: 2023-03-03 | End: 2023-03-10

## 2023-03-03 RX ORDER — POLYMYXIN B SULFATE AND TRIMETHOPRIM 1; 10000 MG/ML; [USP'U]/ML
1-2 SOLUTION OPHTHALMIC EVERY 4 HOURS
Qty: 5 ML | Refills: 0 | Status: SHIPPED | OUTPATIENT
Start: 2023-03-03 | End: 2023-03-10

## 2023-03-03 RX ORDER — POLYMYXIN B SULFATE AND TRIMETHOPRIM 1; 10000 MG/ML; [USP'U]/ML
1-2 SOLUTION OPHTHALMIC EVERY 4 HOURS
Qty: 5 ML | Refills: 0 | Status: CANCELLED | OUTPATIENT
Start: 2023-03-03

## 2023-03-03 NOTE — ADDENDUM NOTE
Addended by: ADITHYA NICOLE on: 3/3/2023 12:25 PM     Modules accepted: Orders, Level of Service, SmartSet

## 2023-03-03 NOTE — TELEPHONE ENCOUNTER
[facility] pharmacy callig to report poly-trim eye drops are out of stock on back order. Requesting alternate rx or to send to another pharmacy and let patient know.    Rose Marie Bauer RN

## 2023-03-22 ENCOUNTER — E-VISIT (OUTPATIENT)
Dept: FAMILY MEDICINE | Facility: CLINIC | Age: 33
End: 2023-03-22
Payer: COMMERCIAL

## 2023-03-22 DIAGNOSIS — L98.9 SKIN LESION: Primary | ICD-10-CM

## 2023-03-22 PROCEDURE — 99422 OL DIG E/M SVC 11-20 MIN: CPT | Performed by: PHYSICIAN ASSISTANT

## 2023-03-29 ENCOUNTER — VIRTUAL VISIT (OUTPATIENT)
Dept: FAMILY MEDICINE | Facility: CLINIC | Age: 33
End: 2023-03-29
Payer: COMMERCIAL

## 2023-03-29 DIAGNOSIS — B00.1 COLD SORE: Primary | ICD-10-CM

## 2023-03-29 PROCEDURE — 99213 OFFICE O/P EST LOW 20 MIN: CPT | Mod: VID | Performed by: INTERNAL MEDICINE

## 2023-03-29 RX ORDER — VALACYCLOVIR HYDROCHLORIDE 500 MG/1
500 TABLET, FILM COATED ORAL DAILY
COMMUNITY
End: 2023-04-27

## 2023-03-29 RX ORDER — VALACYCLOVIR HYDROCHLORIDE 1 G/1
2000 TABLET, FILM COATED ORAL 2 TIMES DAILY
Qty: 4 TABLET | Refills: 0 | Status: SHIPPED | OUTPATIENT
Start: 2023-03-29 | End: 2023-03-30

## 2023-03-29 NOTE — PROGRESS NOTES
Lavern is a 32 year old who is being evaluated via a billable video visit.      How would you like to obtain your AVS? MyChart  If the video visit is dropped, the invitation should be resent by: Send to cell [phone]     Will anyone else be joining your video visit? No          Assessment & Plan     Cold sore start higher dose   - valACYclovir (VALTREX) 1000 mg tablet; Take 2 tablets (2,000 mg) by mouth 2 times daily for 1 day                 Ariane Ricks [facility]    Shivani Puckett is a 32 year old, presenting for the following health issues:  Medication Reconciliation (valACYclovir (VALTREX) 500 mg tablet daily ) and pimple on lip or herpes out break not sure  No flowsheet data found.  HPI     Chief Complaint   Patient presents with     Medication Reconciliation     valACYclovir (VALTREX) 500 mg tablet daily      pimple on lip or herpes out break not sure         Concern - pimple on lip or herpes out break not sure   Onset: yesterday  Description: lip  Intensity: mild  Progression of Symptoms:  same  Accompanying Signs & Symptoms: hurts when touch it  Previous history of similar problem: history of cold sore   Precipitating factors:        Worsened by: touch it   Alleviating factors:        Improved by: hot cloth makes it better  Therapies tried and outcome:  hot cloth makes it better  --area is painful to touch  --there is no tingling  --there was no prodrome except tenderness  --reports remote history of cold sores  --she reports she is taking valtrex 500 mg once daily for genital HSV suppression      Review of Systems   CONSTITUTIONAL: NEGATIVE for fever, chills, change in weight  ENT/MOUTH: NEGATIVE for ear, mouth and throat problems  RESP: NEGATIVE for significant cough or SOB  CV: NEGATIVE for chest pain, palpitations or peripheral edema      Objective    Vitals - Patient Reported  Pain Score: No Pain (0)      Vitals:  No vitals were obtained today due to  virtual visit.    Physical Exam   GENERAL: Healthy, alert and no distress  EYES: Eyes grossly normal to inspection.  No discharge or erythema, or obvious scleral/conjunctival abnormalities.  RESP: No audible wheeze, cough, or visible cyanosis.  No visible retractions or increased work of breathing.    SKIN: vesicle with hyperpigmented base on left lower lip border  NEURO: Cranial nerves grossly intact.  Mentation and speech appropriate for age.  PSYCH: Mentation appears normal, affect normal/bright, judgement and insight intact, normal speech and appearance well-groomed.                Video-Visit Details    Type of service:  Video Visit   Video Start Time: 4:28  Video End Time:4:34 PM    Originating Location (pt. Location): Home    Distant Location (provider location):  On-site  Platform used for Video Visit: Zuleima

## 2023-03-30 ENCOUNTER — MYC MEDICAL ADVICE (OUTPATIENT)
Dept: FAMILY MEDICINE | Facility: CLINIC | Age: 33
End: 2023-03-30
Payer: COMMERCIAL

## 2023-03-30 NOTE — TELEPHONE ENCOUNTER
Smita LOZADA please call patient back when you get moment   Patient adamant about talking to you  I read PCP message to her but she wants to speak   to you        Thanks,  Bj CAMPUZANO RN   [facility]

## 2023-03-30 NOTE — TELEPHONE ENCOUNTER
TATIANA,   Spoke to mom.  States she is moving from [facility] to [facility].   Needs an update letter for the new county - no new forms necessary.  The letter needs to state why mom needs her own room due to PTSD and the kiddos need their own rooms - information noted in patient's MyChart message below.  Will create new encounters in kiddos' charts and route to you.   Hope this all makes sense.   Thanks!  Smita LOZADA

## 2023-03-30 NOTE — TELEPHONE ENCOUNTER
MP,   Please see "[employer] message below and advise.   Patient wants letter completed asap (today) if possible?    TCs:  Please email letter to email listed in patient's chart.     Thanks!  Smita LOZADA

## 2023-03-30 NOTE — TELEPHONE ENCOUNTER
"Letter updated in Namrata's chart with all kids names attached.  MRN:  [id number]  Let's try that.    Thanks  Latesha \"He\" MITCHELL May    "

## 2023-04-04 ENCOUNTER — E-VISIT (OUTPATIENT)
Dept: FAMILY MEDICINE | Facility: CLINIC | Age: 33
End: 2023-04-04
Payer: COMMERCIAL

## 2023-04-04 DIAGNOSIS — B96.89 BV (BACTERIAL VAGINOSIS): Primary | ICD-10-CM

## 2023-04-04 DIAGNOSIS — B37.31 YEAST INFECTION OF THE VAGINA: ICD-10-CM

## 2023-04-04 DIAGNOSIS — N76.0 BV (BACTERIAL VAGINOSIS): Primary | ICD-10-CM

## 2023-04-04 PROCEDURE — 99422 OL DIG E/M SVC 11-20 MIN: CPT | Performed by: FAMILY MEDICINE

## 2023-04-04 RX ORDER — METRONIDAZOLE 7.5 MG/G
1 GEL VAGINAL DAILY
Qty: 70 G | Refills: 0 | Status: SHIPPED | OUTPATIENT
Start: 2023-04-04

## 2023-04-04 RX ORDER — FLUCONAZOLE 150 MG/1
150 TABLET ORAL ONCE
Qty: 1 TABLET | Refills: 0 | Status: SHIPPED | OUTPATIENT
Start: 2023-04-04 | End: 2023-04-04

## 2023-04-25 ENCOUNTER — E-VISIT (OUTPATIENT)
Dept: FAMILY MEDICINE | Facility: CLINIC | Age: 33
End: 2023-04-25
Payer: COMMERCIAL

## 2023-04-25 ENCOUNTER — MYC MEDICAL ADVICE (OUTPATIENT)
Dept: FAMILY MEDICINE | Facility: CLINIC | Age: 33
End: 2023-04-25

## 2023-04-25 DIAGNOSIS — B96.89 BACTERIAL VAGINOSIS: ICD-10-CM

## 2023-04-25 DIAGNOSIS — B37.31 CANDIDAL VULVOVAGINITIS: ICD-10-CM

## 2023-04-25 DIAGNOSIS — N76.0 BACTERIAL VAGINOSIS: ICD-10-CM

## 2023-04-25 PROCEDURE — 99421 OL DIG E/M SVC 5-10 MIN: CPT | Performed by: PHYSICIAN ASSISTANT

## 2023-04-25 RX ORDER — METRONIDAZOLE 500 MG/1
500 TABLET ORAL 2 TIMES DAILY
Qty: 14 TABLET | Refills: 0 | Status: SHIPPED | OUTPATIENT
Start: 2023-04-25 | End: 2023-05-02

## 2023-04-25 RX ORDER — METRONIDAZOLE 7.5 MG/G
1 GEL VAGINAL AT BEDTIME
Qty: 25 G | Refills: 0 | Status: SHIPPED | OUTPATIENT
Start: 2023-04-25 | End: 2023-04-30

## 2023-04-25 RX ORDER — FLUCONAZOLE 150 MG/1
150 TABLET ORAL ONCE
Qty: 1 TABLET | Refills: 0 | Status: SHIPPED | OUTPATIENT
Start: 2023-04-25 | End: 2023-04-25

## 2023-04-25 NOTE — TELEPHONE ENCOUNTER
NEVA.,  Patient called  Requesting gel Metronidazole instead  Pended if you approve  Darlyn SPENCER RN

## 2023-04-26 ENCOUNTER — E-VISIT (OUTPATIENT)
Dept: FAMILY MEDICINE | Facility: CLINIC | Age: 33
End: 2023-04-26
Payer: COMMERCIAL

## 2023-04-26 DIAGNOSIS — B00.1 COLD SORE: ICD-10-CM

## 2023-04-26 PROCEDURE — 99421 OL DIG E/M SVC 5-10 MIN: CPT | Performed by: PHYSICIAN ASSISTANT

## 2023-04-27 RX ORDER — VALACYCLOVIR HYDROCHLORIDE 500 MG/1
1000 TABLET, FILM COATED ORAL DAILY
Qty: 60 TABLET | Refills: 1 | Status: SHIPPED | OUTPATIENT
Start: 2023-04-27 | End: 2023-07-04

## 2023-05-15 ENCOUNTER — E-VISIT (OUTPATIENT)
Dept: URGENT CARE | Facility: CLINIC | Age: 33
End: 2023-05-15
Payer: COMMERCIAL

## 2023-05-15 ENCOUNTER — NURSE TRIAGE (OUTPATIENT)
Dept: NURSING | Facility: CLINIC | Age: 33
End: 2023-05-15

## 2023-05-15 ENCOUNTER — TELEPHONE (OUTPATIENT)
Dept: URGENT CARE | Facility: CLINIC | Age: 33
End: 2023-05-15

## 2023-05-15 DIAGNOSIS — N89.8 VAGINAL DISCHARGE: Primary | ICD-10-CM

## 2023-05-15 PROCEDURE — 99421 OL DIG E/M SVC 5-10 MIN: CPT | Performed by: NURSE PRACTITIONER

## 2023-05-16 NOTE — TELEPHONE ENCOUNTER
"Triage Call:    Caller: Patient  Patient did an e-visit and was told to do a lab test.  She is frustrated as she has BV frequently and \"I know my body\".  Her PCP has treated her many times via e-visit for this.  She has been treated via e-visit x2 in the last 3-4 months for BV and medication prescribed each time.    Leaving town tomorrow early in the am and is trying to get medications tonight at a 24 hour pharmacy.      She is requesting a phone call if needed from provider.       Routing note to e-visit team for follow-up.    Caller verbalized understanding of instructions and questions answered.      Milagros Nix RN on 5/15/2023 at 9:54 PM        Reason for Disposition    Prescription request for new medicine (not a refill)    Protocols used: MEDICATION QUESTION CALL-A-NEAL      "

## 2023-05-16 NOTE — PATIENT INSTRUCTIONS
Thank you for choosing us for your care. Given your symptoms, I would like you to do a lab-only visit to determine what is causing them.  I have placed the orders.  Please schedule an appointment with the lab right here in [employer], or call [phone].  I will let you know when the results are back and next steps to take.

## 2023-06-05 ENCOUNTER — TRANSFERRED RECORDS (OUTPATIENT)
Dept: HEALTH INFORMATION MANAGEMENT | Facility: CLINIC | Age: 33
End: 2023-06-05
Payer: COMMERCIAL

## 2023-06-27 ENCOUNTER — LAB (OUTPATIENT)
Dept: LAB | Facility: CLINIC | Age: 33
End: 2023-06-27
Payer: COMMERCIAL

## 2023-06-27 ENCOUNTER — TELEPHONE (OUTPATIENT)
Dept: FAMILY MEDICINE | Facility: CLINIC | Age: 33
End: 2023-06-27

## 2023-06-27 DIAGNOSIS — Z11.1 SCREENING EXAMINATION FOR PULMONARY TUBERCULOSIS: Primary | ICD-10-CM

## 2023-06-27 DIAGNOSIS — Z11.1 SCREENING EXAMINATION FOR PULMONARY TUBERCULOSIS: ICD-10-CM

## 2023-06-27 PROCEDURE — 36415 COLL VENOUS BLD VENIPUNCTURE: CPT

## 2023-06-27 PROCEDURE — 86481 TB AG RESPONSE T-CELL SUSP: CPT

## 2023-06-27 NOTE — TELEPHONE ENCOUNTER
MP,      Patient needs order for Quantiferon TBfor job screening  Needs signed ASAP!    Order T'd up    Thank you,  Ani Soto RN

## 2023-06-28 LAB
QUANTIFERON MITOGEN: 10 IU/ML
QUANTIFERON NIL TUBE: 0 IU/ML
QUANTIFERON TB1 TUBE: 0 IU/ML
QUANTIFERON TB2 TUBE: 0.02

## 2023-06-29 LAB
GAMMA INTERFERON BACKGROUND BLD IA-ACNC: 0 IU/ML
M TB IFN-G BLD-IMP: NEGATIVE
M TB IFN-G CD4+ BCKGRND COR BLD-ACNC: 10 IU/ML
MITOGEN IGNF BCKGRD COR BLD-ACNC: 0 IU/ML
MITOGEN IGNF BCKGRD COR BLD-ACNC: 0.02 IU/ML

## 2023-06-29 NOTE — RESULT ENCOUNTER NOTE
"Shakila Puckett  Your attached labs are negative for TB.  Please contact the office with any questions or concerns.    Latesha Yates \"He\" MITCHELL May    "

## 2023-07-03 DIAGNOSIS — B00.1 COLD SORE: ICD-10-CM

## 2023-07-03 NOTE — TELEPHONE ENCOUNTER
Routing refill request to provider for review/approval because:  Patient has two different doses on med list- is this the one she's supposed to have?  Berenice CONTRERAS RN

## 2023-07-03 NOTE — TELEPHONE ENCOUNTER
Medication Question or Refill        What medication are you calling about (include dose and sig)?:   valACYclovir (VALTREX) 500 MG tablet    Preferred Pharmacy:  [id number] - [address]  AT [facility] & [address] includes the [facility]  [address] [place] [address]  Phone: [phone] Fax: [phone]      Controlled Substance Agreement on file: CSA - Patient Level:    CSA: None found at the patient level.     Who prescribed the medication?: PCP Plosser    Do you need a refill? Yes    When did you use the medication last? 7/3    Patient offered an appointment? No    Do you have any questions or concerns?  No      Could we send this information to you in [employer] or would you prefer to receive a phone call?:   Patient would prefer a phone call   Okay to leave a detailed message?: Yes at Cell number on file:  Telephone Information:  Mobile          [phone]

## 2023-07-04 ENCOUNTER — TELEPHONE (OUTPATIENT)
Dept: NURSING | Facility: CLINIC | Age: 33
End: 2023-07-04

## 2023-07-04 ENCOUNTER — TELEPHONE (OUTPATIENT)
Dept: NURSING | Facility: CLINIC | Age: 33
End: 2023-07-04
Payer: COMMERCIAL

## 2023-07-04 DIAGNOSIS — B00.1 COLD SORE: ICD-10-CM

## 2023-07-04 RX ORDER — VALACYCLOVIR HYDROCHLORIDE 500 MG/1
500 TABLET, FILM COATED ORAL 2 TIMES DAILY
Qty: 6 TABLET | Refills: 1 | Status: SHIPPED | OUTPATIENT
Start: 2023-07-04

## 2023-07-04 NOTE — TELEPHONE ENCOUNTER
Telephone call:    Patient is calling to request refill of valtrex.    On call provider gave patient 3 day refill of medication, however, patient is requesting her usual refill.  Routing message per Patient request.    Aziza Huertas RN on 7/4/2023 at 11:06 AM

## 2023-07-04 NOTE — TELEPHONE ENCOUNTER
Telephone call:    Patient is requesting refill of valtrex.  Writer told patient refill is pending, however, patient is insistent that the refill get completed today.    Writer paged on call provider & they will make an exception to give the patient a 3 day refill.    Writer will route additional message to PCP per patient request - as patient would like Valtrex refill asap per her symptoms.    Aziza Huertas RN on 7/4/2023 at 11:04 AM

## 2023-07-05 RX ORDER — VALACYCLOVIR HYDROCHLORIDE 500 MG/1
1000 TABLET, FILM COATED ORAL DAILY
Qty: 60 TABLET | Refills: 1 | Status: SHIPPED | OUTPATIENT
Start: 2023-07-05

## 2023-07-05 NOTE — TELEPHONE ENCOUNTER
Refill sent 7/4/23    Due for physical.  [facility] sent to patient.  Ani Soto RN

## 2023-07-20 ENCOUNTER — E-VISIT (OUTPATIENT)
Dept: URGENT CARE | Facility: CLINIC | Age: 33
End: 2023-07-20
Payer: COMMERCIAL

## 2023-07-20 ENCOUNTER — NURSE TRIAGE (OUTPATIENT)
Dept: NURSING | Facility: CLINIC | Age: 33
End: 2023-07-20

## 2023-07-20 DIAGNOSIS — N89.8 VAGINAL DISCHARGE: Primary | ICD-10-CM

## 2023-07-20 PROCEDURE — 99207 PR NON-BILLABLE SERV PER CHARTING: CPT | Performed by: NURSE PRACTITIONER

## 2023-07-21 ENCOUNTER — NURSE TRIAGE (OUTPATIENT)
Dept: NURSING | Facility: CLINIC | Age: 33
End: 2023-07-21
Payer: COMMERCIAL

## 2023-07-21 ENCOUNTER — VIRTUAL VISIT (OUTPATIENT)
Dept: FAMILY MEDICINE | Facility: CLINIC | Age: 33
End: 2023-07-21
Payer: COMMERCIAL

## 2023-07-21 DIAGNOSIS — N94.9 VAGINAL SYMPTOM: Primary | ICD-10-CM

## 2023-07-21 PROCEDURE — 99207 PR NO CHARGE LOS: CPT | Mod: 95 | Performed by: PHYSICIAN ASSISTANT

## 2023-07-21 ASSESSMENT — PATIENT HEALTH QUESTIONNAIRE - PHQ9: SUM OF ALL RESPONSES TO PHQ QUESTIONS 1-9: 0

## 2023-07-21 NOTE — PATIENT INSTRUCTIONS
Thank you for choosing us for your care. I think an in-clinic visit would be best next steps based on your symptoms. Please schedule a clinic appointment; you won t be charged for this eVisit.      You can schedule an appointment right here in [facility], or call [phone]      It looks like from your medical record, this has been a reoccuring issue for you. It would be best if you see GYN to discuss and get tested.

## 2023-07-21 NOTE — TELEPHONE ENCOUNTER
She now wants a virtual visit/e visit set up for this morning since she hasn't heard back from anyone. I connected with scheduling to set up an appointment.  Lucía Villasenor RN  [facility]    Reason for Disposition    General information question, no triage required and triager able to answer question    Additional Information    Negative: [1] Caller is not with the adult (patient) AND [2] reporting urgent symptoms    Negative: Lab result questions    Negative: Medication questions    Negative: Caller can't be reached by phone    Negative: Caller has already spoken to PCP or another triager    Negative: RN needs further essential information from caller in order to complete triage    Negative: Requesting regular office appointment    Negative: [1] Caller requesting NON-URGENT health information AND [2] PCP's office is the best resource    Negative: Health Information question, no triage required and triager able to answer question    Protocols used: INFORMATION ONLY CALL - NO TRIAGE-A-

## 2023-07-21 NOTE — PROGRESS NOTES
Lavern is a 32 year old who is being evaluated via a billable telephone visit.      What phone number would you like to be contacted at? [phone]  How would you like to obtain your AVS? Nadir    Distant Location (provider location):  On-site    Assessment & Plan     Vaginal symptom  This patient set up a telephone visit this morning requesting medication for ongoing discharge. She is currently being treated for BV with clindamycin. Review of her recent history includes multiple wet preps with varying infections of BV, yeast and trichomonas. I explained to her that I recommended testing prior to treating given the numerous and varying recent test results she's had. At that point she became belligerent on the phone, using abusive language towards this provider with untoward language. I calmly explained to her my reasoning/though process behind recommendation of treating and that I did not appreciate her tone/language etc. She continued to use hostile language while screaming at me and subsequently ended the call. No treatment or testing was ordered.    :[id number]}      Seun Gates PA-C  [facility]   Lavern is a 32 year old, presenting for the following health issues:  Vaginal Problem        7/21/2023     6:48 AM   Additional Questions   Roomed by Mahogany BESS   Accompanied by Self         7/21/2023     6:48 AM   Patient Reported Additional Medications   Patient reports taking the following new medications None     HPI     Vaginal Symptoms  Onset/Duration: 7/18/2023  Description:  Vaginal Discharge: white   Itching (Pruritis): YES  Burning sensation:  No  Odor: YES  Accompanying Signs & Symptoms:  Urinary symptoms: No  Abdominal pain: No  Fever: No  History:   Sexually active: No  New Partner: No  Possibility of Pregnancy:  No  Recent antibiotic use: YES  Previous vaginitis issues: YES  Precipitating or alleviating factors: None  Therapies tried and outcome: none and  Clindamycin    Lavern Rodriguez is a 32 year old female who presents today for VIRTUAL VISIT re new onset vaginal milky discharge with odor  Reports itchiness and irritation both internally and externally  No vaginal bleeding noted  No urinary symptoms  No new sexual partners in 2-3 months; no intercourse in last 2-3 months  She is currently taking clindamycin for a recent positive BV test from 7/18/23    Review of Systems   Constitutional, HEENT, cardiovascular, pulmonary, gi and gu systems are negative, except as otherwise noted.      Objective           Vitals:  No vitals were obtained today due to virtual visit.    Physical Exam   alert and extremely angry/offensive  PSYCH: Alert and oriented times 3; coherent speech, normal   rate and volume, able to articulate logical thoughts, able   to abstract reason, no tangential thoughts, no hallucinations   or delusions  Her affect is angry  RESP: No cough, no audible wheezing, able to talk in full sentences  Remainder of exam unable to be completed due to telephone visits    Diagnostics: reviewed extensive recent testing; suggested labs            Phone call duration: 5 minutes - see A/P for note regarding offensive language/behavior on phone call.

## 2023-07-21 NOTE — TELEPHONE ENCOUNTER
She said her previous attempt to set up an E visit, telephone visit or virtual visit disconnected. I connected her with scheduling to set something up.]  Lucía Villasenor, RN  [facility]    Reason for Disposition    General information question, no triage required and triager able to answer question    Additional Information    Negative: [1] Caller is not with the adult (patient) AND [2] reporting urgent symptoms    Negative: Lab result questions    Negative: Medication questions    Negative: Caller can't be reached by phone    Negative: Caller has already spoken to PCP or another triager    Negative: RN needs further essential information from caller in order to complete triage    Negative: Requesting regular office appointment    Negative: [1] Caller requesting NON-URGENT health information AND [2] PCP's office is the best resource    Negative: Health Information question, no triage required and triager able to answer question    Protocols used: INFORMATION ONLY CALL - NO TRIAGE-A-

## 2023-07-21 NOTE — TELEPHONE ENCOUNTER
"Pt reports she is very upset e visit provider today recommended she be seen for vaginal discharge/irritation. Pt reports \"I know my body, I know what the problem is\". Pt is requesting oral medication for yeast infection and states she does not want to wait until tomorrow. Pt states she is unable to go to  due to having work a double shift.     Writer offered to schedule pt for a telephone visit at 8 am or send message to PCP for follow up tomorrow.     Pt refuses Writer recommendations and and requests Writer send message to e visit provider. Pt states if she does not hear back from e visit provider within ten minutes she will call back.     Reason for Disposition    Prescription request for new medicine (not a refill)    Protocols used: MEDICATION QUESTION CALL-A-AH      "

## 2023-09-11 ENCOUNTER — NURSE TRIAGE (OUTPATIENT)
Dept: NURSING | Facility: CLINIC | Age: 33
End: 2023-09-11

## 2023-09-11 ENCOUNTER — E-VISIT (OUTPATIENT)
Dept: URGENT CARE | Facility: CLINIC | Age: 33
End: 2023-09-11
Payer: COMMERCIAL

## 2023-09-11 DIAGNOSIS — B96.89 BV (BACTERIAL VAGINOSIS): ICD-10-CM

## 2023-09-11 DIAGNOSIS — N76.0 BV (BACTERIAL VAGINOSIS): ICD-10-CM

## 2023-09-11 DIAGNOSIS — N89.8 VAGINAL DISCHARGE: Primary | ICD-10-CM

## 2023-09-11 PROCEDURE — 99207 PR NON-BILLABLE SERV PER CHARTING: CPT | Performed by: NURSE PRACTITIONER

## 2023-09-12 ENCOUNTER — LAB (OUTPATIENT)
Dept: LAB | Facility: CLINIC | Age: 33
End: 2023-09-12
Payer: COMMERCIAL

## 2023-09-12 DIAGNOSIS — N89.8 VAGINAL DISCHARGE: ICD-10-CM

## 2023-09-12 DIAGNOSIS — R53.83 FATIGUE, UNSPECIFIED TYPE: ICD-10-CM

## 2023-09-12 LAB
BASOPHILS # BLD AUTO: 0 10E3/UL (ref 0–0.2)
BASOPHILS NFR BLD AUTO: 1 %
CLUE CELLS: PRESENT
EOSINOPHIL # BLD AUTO: 0 10E3/UL (ref 0–0.7)
EOSINOPHIL NFR BLD AUTO: 1 %
ERYTHROCYTE [DISTWIDTH] IN BLOOD BY AUTOMATED COUNT: 12.6 % (ref 10–15)
FERRITIN SERPL-MCNC: 18 NG/ML (ref 6–175)
HCT VFR BLD AUTO: 35.4 % (ref 35–47)
HGB BLD-MCNC: 11.9 G/DL (ref 11.7–15.7)
IMM GRANULOCYTES # BLD: 0 10E3/UL
IMM GRANULOCYTES NFR BLD: 0 %
LYMPHOCYTES # BLD AUTO: 1.4 10E3/UL (ref 0.8–5.3)
LYMPHOCYTES NFR BLD AUTO: 34 %
MCH RBC QN AUTO: 31.1 PG (ref 26.5–33)
MCHC RBC AUTO-ENTMCNC: 33.6 G/DL (ref 31.5–36.5)
MCV RBC AUTO: 92 FL (ref 78–100)
MONOCYTES # BLD AUTO: 0.2 10E3/UL (ref 0–1.3)
MONOCYTES NFR BLD AUTO: 6 %
NEUTROPHILS # BLD AUTO: 2.4 10E3/UL (ref 1.6–8.3)
NEUTROPHILS NFR BLD AUTO: 58 %
NRBC # BLD AUTO: 0 10E3/UL
NRBC BLD AUTO-RTO: 0 /100
PLATELET # BLD AUTO: 274 10E3/UL (ref 150–450)
RBC # BLD AUTO: 3.83 10E6/UL (ref 3.8–5.2)
TRICHOMONAS, WET PREP: ABNORMAL
TSH SERPL DL<=0.005 MIU/L-ACNC: 0.36 UIU/ML (ref 0.3–4.2)
WBC # BLD AUTO: 4.1 10E3/UL (ref 4–11)
WBC'S/HIGH POWER FIELD, WET PREP: ABNORMAL
YEAST, WET PREP: ABNORMAL

## 2023-09-12 PROCEDURE — 36415 COLL VENOUS BLD VENIPUNCTURE: CPT

## 2023-09-12 PROCEDURE — 87210 SMEAR WET MOUNT SALINE/INK: CPT

## 2023-09-12 PROCEDURE — 82728 ASSAY OF FERRITIN: CPT

## 2023-09-12 PROCEDURE — 84443 ASSAY THYROID STIM HORMONE: CPT

## 2023-09-12 PROCEDURE — 85025 COMPLETE CBC W/AUTO DIFF WBC: CPT

## 2023-09-12 RX ORDER — METRONIDAZOLE 500 MG/1
500 TABLET ORAL 2 TIMES DAILY
Qty: 14 TABLET | Refills: 0 | Status: SHIPPED | OUTPATIENT
Start: 2023-09-12 | End: 2023-09-19

## 2023-09-12 NOTE — RESULT ENCOUNTER NOTE
"Shakila Puckett  Your attached labs are within normal limits, but your ferritin (iron storage) is still low.  Are you taking over the counter Vitron C one tab by mouth daily to every other day for your iron levels? The vit C in the tab enhances the absorption of iron. Please take the medication in between meals and do not combine  with dairy products.  Side effects of iron  are as follows: rash, abdominal pain, constipation, blackish discoloration of tongue and stools.    Choose iron-rich foods  Foods rich in iron include:    Red meat, pork and poultry  Seafood  Beans  Dark green leafy vegetables, such as spinach  Dried fruit, such as raisins and apricots  Iron-fortified cereals, breads and pastas  Peas  Your body absorbs more iron from meat than it does from other sources. If you choose to not eat meat, you may need to increase your intake of iron-rich, plant-based foods to absorb the same amount of iron as does someone who eats meat.    Choose foods containing vitamin C to enhance iron absorption  You can enhance your body's absorption of iron by drinking citrus juice or eating other foods rich in vitamin C at the same time that you eat high-iron foods. Vitamin C in citrus juices, like orange juice, helps your body to better absorb dietary iron.    Vitamin C is also found in:    Broccoli  Grapefruit  Kiwi  Leafy greens  Melons  Oranges  Peppers  Strawberries  Tangerines  Tomatoes    Please contact the office with any questions or concerns.    Latesha Yates \"He\" MITCHELL May    "

## 2023-09-12 NOTE — TELEPHONE ENCOUNTER
"Patient calling after having an E-visit where provider ordered a lab test.  Patient wanted to now why they were having her do a lab test.  She describes her symptoms as white, milky discharge and is a \"yeast infection\" and wanted medication prescribed to her.    Writer advised provider wants the test to be sure patient gets the best treatment for her symptoms.  Patient declined to schedule lab test and said she would treat symptoms with OTC medication.    Nelly San RN  [facility]      Reason for Disposition   Prescription request for new medicine (not a refill)    Protocols used: Medication Question Call-A-AH    "

## 2023-09-12 NOTE — PATIENT INSTRUCTIONS
Thank you for choosing us for your care. Given your symptoms, I would like you to do a lab-only visit to determine what is causing them.  I have placed the orders.  Please schedule an appointment with the lab right here in [facility], or call [phone].  I will let you know when the results are back and next steps to take.  Vaginitis: Care Instructions  Overview     Vaginitis is soreness or infection of the vagina. This common problem can cause itching and burning. And it can cause a change in vaginal discharge. Sometimes it can cause pain during sex. Vaginitis may be caused by bacteria, yeast, or other germs. Some infections that cause it are caught from a sexual partner. Bath products, spermicides, and douches can irritate the vagina too.  This problem can also happen during and after menopause. A drop in estrogen levels during this time can cause dryness, soreness, and pain during sex.  Your doctor can give you medicine to treat vaginitis. And home care may help you feel better. For certain types of infections, your sex partner(s) must be treated too.  Follow-up care is a key part of your treatment and safety. Be sure to make and go to all appointments, and call your doctor if you are having problems. It's also a good idea to know your test results and keep a list of the medicines you take.  How can you care for yourself at home?  Take your medicines exactly as prescribed. Call your doctor if you think you are having a problem with your medicine.  Ask your doctor if your sex partner(s) also needs treatment.  Do not eat or drink anything that has alcohol if you are taking metronidazole (Flagyl).  Wash your vulva daily with water. You also can use a mild, unscented soap if you want.  Do not use scented bath products. And do not use vaginal sprays or douches.  Change out of wet or damp clothes as soon as possible. Wear cotton underwear. And avoid tight clothing that could increase moisture.  Put a washcloth soaked  "in cool water on the area to relieve itching. Or you can take cool baths.  If you have dryness because of menopause, use estrogen cream or pills that your doctor prescribes.  Ask your doctor about when it is okay to have sex.  Use a personal lubricant before sex if you have dryness. Examples are Astroglide, K-Y Jelly, and Wet Lubricant Gel.  When should you call for help?   Call your doctor now or seek immediate medical care if:    You have a fever.     You have new or increased pain in your vagina or pelvis.     You have new or worse vaginal itching or discharge.   Watch closely for changes in your health, and be sure to contact your doctor if:    You have bleeding other than your period.     You do not get better as expected.   Where can you learn more?  Go to [email or web address]/patiented  Enter F219 in the search box to learn more about \"Vaginitis: Care Instructions.\"  Current as of: August 2, 2022               Content Version: 13.7    3156-8142 [employer].   Care instructions adapted under license by your healthcare professional. If you have questions about a medical condition or this instruction, always ask your healthcare professional. [employer] disclaims any warranty or liability for your use of this information.      "

## 2023-09-12 NOTE — TELEPHONE ENCOUNTER
Wet prep positive for clue cells. medication ordered. Set message to pt.      Provider E-Visit time total (minutes):

## 2023-10-10 ENCOUNTER — E-VISIT (OUTPATIENT)
Dept: URGENT CARE | Facility: CLINIC | Age: 33
End: 2023-10-10
Payer: COMMERCIAL

## 2023-10-10 DIAGNOSIS — Z11.3 SCREENING FOR STD (SEXUALLY TRANSMITTED DISEASE): ICD-10-CM

## 2023-10-10 DIAGNOSIS — Z20.2 EXPOSURE TO SEXUALLY TRANSMITTED DISEASE (STD): Primary | ICD-10-CM

## 2023-10-10 PROCEDURE — 99207 PR NON-BILLABLE SERV PER CHARTING: CPT | Performed by: PHYSICIAN ASSISTANT

## 2023-10-11 NOTE — PATIENT INSTRUCTIONS
Thank you for choosing us for your care. Given your symptoms, I would like you to do a lab-only visit to determine what is causing them.  I have placed the orders.  Please schedule an appointment with the lab right here in [place], or call [phone].  I will let you know when the results are back and next steps to take.

## 2024-01-30 NOTE — TELEPHONE ENCOUNTER
Message from call center that [facility] at 22 weeks.    Called [facility], who states that she no longer needs procedure.  
Detail Level: Generalized

## 2024-04-12 ENCOUNTER — ANCILLARY PROCEDURE (OUTPATIENT)
Dept: GENERAL RADIOLOGY | Facility: CLINIC | Age: 34
End: 2024-04-12
Attending: OBSTETRICS & GYNECOLOGY
Payer: MEDICARE

## 2024-04-12 DIAGNOSIS — Z31.49 INVESTIGATION AND TESTING FOR PROCREATION MANAGEMENT: ICD-10-CM

## 2024-04-12 PROCEDURE — 58340 CATHETER FOR HYSTEROGRAPHY: CPT

## 2024-12-16 ENCOUNTER — TELEPHONE (OUTPATIENT)
Dept: NURSING | Facility: CLINIC | Age: 34
End: 2024-12-16
Payer: MEDICARE

## 2024-12-16 DIAGNOSIS — B96.89 BV (BACTERIAL VAGINOSIS): Primary | ICD-10-CM

## 2024-12-16 DIAGNOSIS — N76.0 BV (BACTERIAL VAGINOSIS): Primary | ICD-10-CM

## 2024-12-16 RX ORDER — METRONIDAZOLE 500 MG/1
500 TABLET ORAL 2 TIMES DAILY
Qty: 14 TABLET | Refills: 0 | Status: SHIPPED | OUTPATIENT
Start: 2024-12-16 | End: 2024-12-23

## 2024-12-17 NOTE — TELEPHONE ENCOUNTER
[facility] apt on 12/13. Pt states she is wondering if her lab resulted yet to see if she has BV. Writer relayed the below message:         Discussed with pt that her prescription was sent in to [facility].       No need for triage.

## 2024-12-22 ENCOUNTER — HEALTH MAINTENANCE LETTER (OUTPATIENT)
Age: 34
End: 2024-12-22

## 2025-02-27 ENCOUNTER — TELEPHONE (OUTPATIENT)
Dept: FAMILY MEDICINE | Facility: CLINIC | Age: 35
End: 2025-02-27
Payer: MEDICARE

## 2025-02-27 NOTE — TELEPHONE ENCOUNTER
"Called pt and recommended an E-visit for TB Gold lab request.   Also sent pt a [email or web address] message with instructions on how to complete an E-visit.   Pt stated: \"I've never had to do this before. They always just put in the order. How hard is it to do that? Never mind. I'll just call [facility]\".   Pt hung up the call.     Aurora FUNEZ RN    "

## 2025-03-18 NOTE — TELEPHONE ENCOUNTER
"I thought I already did letters/forms for the kids... need to make sure there is a release of medical records form on file (for the kids?) to be able to put in further details.  Maybe see if patient has specific forms from company requesting this information that need to be completed rather than a generic letter?  Also make sure we get the [facility]  details so we can document it in their chart preferably.    Thanks  Latesha \"He\" MITCHELL May   " 03/18/25 3:22 PM    Patient contacted to bring Advance Directive, POLST, or Living Will document to next scheduled pcp visit.[facility] Department left message.    Thank you.  Windy Guerin MA  [facility]

## (undated) DEVICE — SU VICRYL 3-0 SH CR 8X18" J774

## (undated) DEVICE — PEN MARKING SKIN

## (undated) DEVICE — NDL COUNTER 40CT  31142311

## (undated) DEVICE — SPONGE LAP 18X18" X8435

## (undated) DEVICE — BASIN SET MAJOR

## (undated) DEVICE — ESU PENCIL W/SMOKE EVAC CVPLP2000

## (undated) DEVICE — SU VICRYL 0 CT-1 27" J340H

## (undated) DEVICE — SU PDS II 1 CT MONOFIL Z353H

## (undated) DEVICE — LABEL MEDICATION SYSTEM 3303-P

## (undated) DEVICE — ESU GROUND PAD ADULT W/CORD E7507

## (undated) DEVICE — LINEN TOWEL PACK X10 5473

## (undated) DEVICE — LINEN FULL SHEET 5511

## (undated) DEVICE — SUCTION TIP YANKAUER W/O VENT K86

## (undated) DEVICE — DRAPE LAP W/POUCH 9430

## (undated) DEVICE — PREP POVIDONE IODINE SOLUTION 10% 120ML

## (undated) DEVICE — LIGHT HANDLE X2

## (undated) DEVICE — SYR 10ML LL W/O NDL 302995

## (undated) DEVICE — GOWN IMPERVIOUS ZONED XLG 9041

## (undated) DEVICE — PACK MAJOR SBA15MAFSI

## (undated) DEVICE — NDL ECLIPSE 25GA 1.5"

## (undated) DEVICE — BLADE KNIFE SURG 10 371110

## (undated) DEVICE — STPL LINEAR CUT 75MM TLC75

## (undated) DEVICE — ESU PENCIL W/HOLSTER E2350H

## (undated) DEVICE — DRAPE IOBAN INCISE 23X17" 6650EZ

## (undated) DEVICE — PREP CHLORAPREP 26ML TINTED HI-LITE ORANGE 930815

## (undated) DEVICE — GLOVE PROTEXIS W/NEU-THERA 7.0  2D73TE70

## (undated) DEVICE — TUBING SUCTION 12"X1/4" N612

## (undated) DEVICE — SU VICRYL 3-0 SH 27" J316H

## (undated) DEVICE — NDL 22GA 1.5"

## (undated) DEVICE — DRSG GAUZE 2X2" 8042

## (undated) DEVICE — MANIFOLD NEPTUNE 4 PORT 700-20

## (undated) DEVICE — LINEN HALF SHEET 5512

## (undated) DEVICE — SPONGE RAY-TEC 4X8" 7318

## (undated) DEVICE — BAG CLEAR TRASH 1.3M 39X33" P4040C

## (undated) DEVICE — CATH TRAY FOLEY SURESTEP 16FR DRAIN BAG STATOCK A899916

## (undated) DEVICE — STPL LINEAR 90 X 3.5MM TA9035S

## (undated) DEVICE — PREP POVIDONE IODINE SCRUB 7.5% 120ML

## (undated) RX ORDER — ONDANSETRON 2 MG/ML
INJECTION INTRAMUSCULAR; INTRAVENOUS
Status: DISPENSED
Start: 2021-11-30

## (undated) RX ORDER — GLYCOPYRROLATE 0.2 MG/ML
INJECTION INTRAMUSCULAR; INTRAVENOUS
Status: DISPENSED
Start: 2021-11-30

## (undated) RX ORDER — PROPOFOL 10 MG/ML
INJECTION, EMULSION INTRAVENOUS
Status: DISPENSED
Start: 2021-11-30

## (undated) RX ORDER — DEXAMETHASONE SODIUM PHOSPHATE 4 MG/ML
INJECTION, SOLUTION INTRA-ARTICULAR; INTRALESIONAL; INTRAMUSCULAR; INTRAVENOUS; SOFT TISSUE
Status: DISPENSED
Start: 2021-11-30

## (undated) RX ORDER — FENTANYL CITRATE 50 UG/ML
INJECTION, SOLUTION INTRAMUSCULAR; INTRAVENOUS
Status: DISPENSED
Start: 2021-11-30

## (undated) RX ORDER — HEPARIN SODIUM 5000 [USP'U]/.5ML
INJECTION, SOLUTION INTRAVENOUS; SUBCUTANEOUS
Status: DISPENSED
Start: 2021-11-30

## (undated) RX ORDER — KETOROLAC TROMETHAMINE 30 MG/ML
INJECTION, SOLUTION INTRAMUSCULAR; INTRAVENOUS
Status: DISPENSED
Start: 2021-11-30

## (undated) RX ORDER — CELECOXIB 200 MG/1
CAPSULE ORAL
Status: DISPENSED
Start: 2021-11-30

## (undated) RX ORDER — CEFAZOLIN SODIUM/WATER 2 G/20 ML
SYRINGE (ML) INTRAVENOUS
Status: DISPENSED
Start: 2021-11-30

## (undated) RX ORDER — ACETAMINOPHEN 325 MG/1
TABLET ORAL
Status: DISPENSED
Start: 2021-11-30

## (undated) RX ORDER — LIDOCAINE HYDROCHLORIDE 10 MG/ML
INJECTION, SOLUTION EPIDURAL; INFILTRATION; INTRACAUDAL; PERINEURAL
Status: DISPENSED
Start: 2021-11-30

## (undated) RX ORDER — ALVIMOPAN 12 MG/1
CAPSULE ORAL
Status: DISPENSED
Start: 2021-11-30